# Patient Record
Sex: FEMALE | Race: BLACK OR AFRICAN AMERICAN | NOT HISPANIC OR LATINO | URBAN - METROPOLITAN AREA
[De-identification: names, ages, dates, MRNs, and addresses within clinical notes are randomized per-mention and may not be internally consistent; named-entity substitution may affect disease eponyms.]

---
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
## 2018-08-18 ENCOUNTER — EMERGENCY (EMERGENCY)
Facility: HOSPITAL | Age: 18
LOS: 0 days | Discharge: HOME | End: 2018-08-18
Attending: EMERGENCY MEDICINE | Admitting: EMERGENCY MEDICINE

## 2018-08-18 VITALS
WEIGHT: 110.23 LBS | DIASTOLIC BLOOD PRESSURE: 60 MMHG | OXYGEN SATURATION: 100 % | TEMPERATURE: 98 F | HEART RATE: 83 BPM | RESPIRATION RATE: 18 BRPM | SYSTOLIC BLOOD PRESSURE: 119 MMHG

## 2018-08-18 DIAGNOSIS — R10.30 LOWER ABDOMINAL PAIN, UNSPECIFIED: ICD-10-CM

## 2018-08-18 DIAGNOSIS — B37.9 CANDIDIASIS, UNSPECIFIED: ICD-10-CM

## 2018-08-18 LAB
APPEARANCE UR: ABNORMAL
BILIRUB UR-MCNC: NEGATIVE — SIGNIFICANT CHANGE UP
COLOR SPEC: YELLOW — SIGNIFICANT CHANGE UP
DIFF PNL FLD: NEGATIVE — SIGNIFICANT CHANGE UP
GLUCOSE UR QL: NEGATIVE MG/DL — SIGNIFICANT CHANGE UP
HCG UR QL: NEGATIVE — SIGNIFICANT CHANGE UP
KETONES UR-MCNC: NEGATIVE — SIGNIFICANT CHANGE UP
LEUKOCYTE ESTERASE UR-ACNC: ABNORMAL
NITRITE UR-MCNC: NEGATIVE — SIGNIFICANT CHANGE UP
PH UR: 6 — SIGNIFICANT CHANGE UP (ref 5–8)
PROT UR-MCNC: NEGATIVE MG/DL — SIGNIFICANT CHANGE UP
SP GR SPEC: 1.02 — SIGNIFICANT CHANGE UP (ref 1.01–1.03)
UROBILINOGEN FLD QL: 1 MG/DL (ref 0.2–0.2)

## 2018-08-18 RX ORDER — FLUCONAZOLE 150 MG/1
150 TABLET ORAL ONCE
Qty: 0 | Refills: 0 | Status: COMPLETED | OUTPATIENT
Start: 2018-08-18 | End: 2018-08-18

## 2018-08-18 RX ADMIN — FLUCONAZOLE 150 MILLIGRAM(S): 150 TABLET ORAL at 11:10

## 2018-08-18 NOTE — ED PROVIDER NOTE - OBJECTIVE STATEMENT
Patient is a healthy 17 year old female with no pmhx who presents to the ED with a 2 week history of cloudy and foul smelling urine, followed by x3 days of abdominal tenderness and 2x days of creamy frothy discharge.  Patient states that she endorses this is a yeast infection as she has had them in the past.  Patient denies any urinary urgency, increased urinary frequency, or burning sensation.  She endorses pruritus.  PMhx: none  PSx: none  medications: none  Allergies: none  UTD vaccines  Family history: not pertinent  HEADSS:  sexually active with one male partner, does not use condoms, has an irregular period, last tested for STIs in May 2018, all of which were negative.  Denies smoking, drinking or drugs.

## 2018-08-18 NOTE — ED PROVIDER NOTE - ATTENDING CONTRIBUTION TO CARE
16 yo F with no PMH, here with 2 weeks of cloudy and foul-smelling urine, and 2 days of suprapubic pain, and 1 day of white creamy discharge (h/o yeast infection in past and she feels this is similar). No urgency, no frequency. (+) sexually active with 1 male partner, no condom use. LMP 1 month ago, not regular and on OCP. Was tested for STIs in May and all negative, no change in partners since. No recent antibiotic use. Also has a mild cough x 2 weeks, afebrile. Exam - Gen - NAD, Head - NCAT, Pharynx - clear, MMM, Heart - RRR, no m/g/r, Lungs - CTAB, no w/c/r, Abdomen - soft, mild suprapubic tenderness, ND,  - External labia - visible white discharge, no redness, pelvic - No CMT, adnexal tenderness. 16 yo F with no PMH, here with 2 weeks of cloudy and foul-smelling urine, and 2 days of suprapubic pain, and 1 day of white creamy discharge (h/o yeast infection in past and she feels this is similar). (+) vulvar pruritis. No urgency, no frequency. (+) sexually active with 1 male partner, no condom use. LMP 1 month ago, not regular and on OCP. Was tested for STIs in May and all negative, no change in partners since. No recent antibiotic use. Also has a mild cough x 2 weeks, afebrile. Exam - Gen - NAD, Head - NCAT,  Heart - RRR, no m/g/r, Lungs - CTAB, no w/c/r, Abdomen - soft, mild suprapubic tenderness, ND,  - External labia - visible white discharge, no redness, pelvic - No CMT, adnexal tenderness. Patient refused empiric STI treatment, will wait for urine results. Plan - udip, upreg, UCx, urine GC/chlamydia, fluconazole. D/C home with ID clinic f/u.

## 2018-08-18 NOTE — ED PROVIDER NOTE - MEDICAL DECISION MAKING DETAILS
16 yo F with h/o yeast infection, here with similar symptoms, and mild suprapubic tenderness. Afebrile. No CVA tenderness, no urinary symptoms. Dx - yeast infection, d/ashlee home with fluconazole, advised ID clinic f/u for STI test results.

## 2018-08-18 NOTE — ED PROVIDER NOTE - CARE PLAN
Principal Discharge DX:	Candida albicans infection  Goal:	Treatment of infection  Assessment and plan of treatment:	Please follow up with infectious disease specialist for the result of your GC and chlamydia testing.  Please follow up with PMD in 2-3 days if the symptoms continue.  Please seek medical attention for any new or worsening medical conditions.

## 2018-08-18 NOTE — ED PROVIDER NOTE - PLAN OF CARE
Treatment of infection Please follow up with infectious disease specialist for the result of your GC and chlamydia testing.  Please follow up with PMD in 2-3 days if the symptoms continue.  Please seek medical attention for any new or worsening medical conditions.

## 2018-08-18 NOTE — ED PROVIDER NOTE - PROGRESS NOTE DETAILS
Patient underwent internal pelvic examination, no cervical, adnexal or uterine tenderness on palpation.  Creamy discharge seen on cervical exam.  Urine dip and urine pregnancy negative.  Patient received x1 dose of oral fluconazole and set for d/c. Patient underwent internal pelvic examination, no cervical, adnexal or uterine tenderness on palpation.  Creamy discharge seen on cervical exam.  Urine dip only positive for large leukocytes and urine pregnancy negative.  Given large vaginal discharge, leukocytes considered contaminant however urine culture sent to confirm.  Patient received x1 dose of oral fluconazole and set for d/c.

## 2018-08-20 LAB
-  AMIKACIN: SIGNIFICANT CHANGE UP
-  AMOXICILLIN/CLAVULANIC ACID: SIGNIFICANT CHANGE UP
-  AMPICILLIN/SULBACTAM: SIGNIFICANT CHANGE UP
-  AMPICILLIN: SIGNIFICANT CHANGE UP
-  AZTREONAM: SIGNIFICANT CHANGE UP
-  CEFAZOLIN: SIGNIFICANT CHANGE UP
-  CEFEPIME: SIGNIFICANT CHANGE UP
-  CEFOXITIN: SIGNIFICANT CHANGE UP
-  CEFTRIAXONE: SIGNIFICANT CHANGE UP
-  CIPROFLOXACIN: SIGNIFICANT CHANGE UP
-  ERTAPENEM: SIGNIFICANT CHANGE UP
-  GENTAMICIN: SIGNIFICANT CHANGE UP
-  IMIPENEM: SIGNIFICANT CHANGE UP
-  LEVOFLOXACIN: SIGNIFICANT CHANGE UP
-  MEROPENEM: SIGNIFICANT CHANGE UP
-  NITROFURANTOIN: SIGNIFICANT CHANGE UP
-  PIPERACILLIN/TAZOBACTAM: SIGNIFICANT CHANGE UP
-  TIGECYCLINE: SIGNIFICANT CHANGE UP
-  TOBRAMYCIN: SIGNIFICANT CHANGE UP
-  TRIMETHOPRIM/SULFAMETHOXAZOLE: SIGNIFICANT CHANGE UP
C TRACH RRNA SPEC QL NAA+PROBE: SIGNIFICANT CHANGE UP
CULTURE RESULTS: SIGNIFICANT CHANGE UP
METHOD TYPE: SIGNIFICANT CHANGE UP
N GONORRHOEA RRNA SPEC QL NAA+PROBE: SIGNIFICANT CHANGE UP
ORGANISM # SPEC MICROSCOPIC CNT: SIGNIFICANT CHANGE UP
ORGANISM # SPEC MICROSCOPIC CNT: SIGNIFICANT CHANGE UP
SPECIMEN SOURCE: SIGNIFICANT CHANGE UP
SPECIMEN SOURCE: SIGNIFICANT CHANGE UP

## 2018-08-21 RX ORDER — CEFUROXIME AXETIL 250 MG
1 TABLET ORAL
Qty: 14 | Refills: 0 | OUTPATIENT
Start: 2018-08-21 | End: 2018-08-27

## 2018-08-21 NOTE — ED POST DISCHARGE NOTE - DETAILS
CEFTIN CALLED INTO PHARMACY- LEFT MESSAGE ON PHARMACIST VOICEMAIL-  [address] [facility]- [id number]. SPOKE WITH PATIENT AND SHE WILL F/U WITH PMD

## 2018-12-28 ENCOUNTER — EMERGENCY (EMERGENCY)
Facility: HOSPITAL | Age: 18
LOS: 0 days | Discharge: HOME | End: 2018-12-28
Attending: EMERGENCY MEDICINE | Admitting: EMERGENCY MEDICINE

## 2018-12-28 VITALS
RESPIRATION RATE: 18 BRPM | HEART RATE: 86 BPM | OXYGEN SATURATION: 100 % | SYSTOLIC BLOOD PRESSURE: 115 MMHG | TEMPERATURE: 99 F | DIASTOLIC BLOOD PRESSURE: 80 MMHG | WEIGHT: 117.29 LBS

## 2018-12-28 DIAGNOSIS — Z79.899 OTHER LONG TERM (CURRENT) DRUG THERAPY: ICD-10-CM

## 2018-12-28 DIAGNOSIS — M54.9 DORSALGIA, UNSPECIFIED: ICD-10-CM

## 2018-12-28 DIAGNOSIS — M54.5 LOW BACK PAIN: ICD-10-CM

## 2018-12-28 RX ORDER — METHOCARBAMOL 500 MG/1
1 TABLET, FILM COATED ORAL
Qty: 7 | Refills: 0
Start: 2018-12-28 | End: 2019-01-03

## 2018-12-28 NOTE — ED PROVIDER NOTE - PHYSICAL EXAMINATION
General: Well developed; well nourished; in no acute distress    Head: Normocephalic; atraumatic  ENMT: External ear normal, tympanic membranes intact, nasal mucosa normal, no nasal discharge; airway clear, oropharynx clear  Respiratory: No chest wall deformity, normal respiratory pattern, clear to auscultation bilaterally  Cardiovascular: Regular rate and rhythm. S1 and S2 Normal; No murmurs, gallops or rubs  Genitourinary: No costovertebral angle tenderness.  Extremities: Full range of motion, no tenderness, no cyanosis or edema  Vascular: Upper and lower peripheral pulses palpable 2+ bilaterally  Neurological: Alert, affect appropriate, no acute change from baseline.   Skin: Warm and dry. No acute rash, no subcutaneous nodules  Musculoskeletal: Normal gait, tone, without deformities, no tenderness to palpation along the back or spine.   Psychiatric: Cooperative and appropriate

## 2018-12-28 NOTE — ED PROVIDER NOTE - ATTENDING CONTRIBUTION TO CARE
18 year old female, no pmhx, presenting with intermittent back pain x 1 week. States it is sharp, paraspinal, feels like an electric shock. States she gets an initial shock and then it lasts as a dull pain anywhere from 30 minutes to an hour, radiating down her back. No known palliative or provocative factors, 7/10 at its worst. Otherwise denies fevers, IVDA, incontinence, headache, vision changes, weakness/numbness, confusion, URI symptoms, neck pain, chest pain, back pain, dyspnea, cough, palpitations, nausea, vomiting, abdominal pain, diarrhea, constipation, blood in stool/dark stools, urinary symptoms, vaginal bleeding/discharge, leg swelling, rash, recent travel or sick contacts.    Vital Signs: I have reviewed the initial vital signs.  Constitutional: NAD, well-nourished, appears stated age, no acute distress.  HEENT: Airway patent, moist MM, no erythema/swelling/deformity of oral structures. EOMI, PERRLA.  CV: regular rate, regular rhythm, well-perfused extremities, 2+ b/l DP and radial pulses equal.  Lungs: BCTA, no increased WOB.  ABD: NTND, no guarding or rebound, no pulsatile mass, no hernias.   MSK: Neck supple, nontender, nl ROM, no stepoff. Chest nontender. Back nontender in TLS spine or to b/l bony structures or flanks. Ext nontender, nl rom, no deformity. (+) Tenderness in paraspinal lumbar area bilaterally with (+) muscle spasm  INTEG: Skin warm, dry, no rash.  NEURO: A&Ox3, CN II-XII intact, normal strength 5/5 all 4 ext, nl sensation throughout, normal speech and coordination.  PSYCH: Calm, cooperative, normal affect and interaction.

## 2018-12-28 NOTE — ED PROVIDER NOTE - MEDICAL DECISION MAKING DETAILS
Patient presented with lower back pain that on exam appears to be muscle spasm. Neuro intact, no concerning signs for cauda equina, no trauma, no fevers, no IVDA, well appearing. Symptoms improve at home per patient with naprosyn. Will prescribe robaxin along with the naprosyn and patient agrees to follow up with PMD. Will also give neuro follow up as well given shock-like sensation. Patient ambulatory and tolerates PO, afebrile and HD stable.

## 2018-12-28 NOTE — ED PROVIDER NOTE - OBJECTIVE STATEMENT
18 year old female with no PMH presents with intermittent back pain that started one week ago. Pt. states that the pain is sharp, feels like electric shock going thru her. It last for an hour and then gradually goes away. Pt. tried naproxen for the pain but is unsure if it helped. She denies any urinary symptoms or incontinence.

## 2019-01-12 ENCOUNTER — OUTPATIENT (OUTPATIENT)
Dept: OUTPATIENT SERVICES | Facility: HOSPITAL | Age: 19
LOS: 1 days | Discharge: HOME | End: 2019-01-12

## 2019-01-12 DIAGNOSIS — M54.5 LOW BACK PAIN: ICD-10-CM

## 2019-01-14 ENCOUNTER — RESULT CHARGE (OUTPATIENT)
Age: 19
End: 2019-01-14

## 2019-01-14 ENCOUNTER — OUTPATIENT (OUTPATIENT)
Dept: OUTPATIENT SERVICES | Facility: HOSPITAL | Age: 19
LOS: 1 days | Discharge: HOME | End: 2019-01-14

## 2019-01-14 ENCOUNTER — APPOINTMENT (OUTPATIENT)
Dept: PEDIATRIC ADOLESCENT MEDICINE | Facility: CLINIC | Age: 19
End: 2019-01-14

## 2019-01-14 VITALS
BODY MASS INDEX: 20.65 KG/M2 | SYSTOLIC BLOOD PRESSURE: 118 MMHG | DIASTOLIC BLOOD PRESSURE: 62 MMHG | HEART RATE: 94 BPM | HEIGHT: 63.5 IN | WEIGHT: 118 LBS | RESPIRATION RATE: 20 BRPM

## 2019-01-14 DIAGNOSIS — Z30.41 ENCOUNTER FOR SURVEILLANCE OF CONTRACEPTIVE PILLS: ICD-10-CM

## 2019-01-14 DIAGNOSIS — Z70.9 SEX COUNSELING, UNSPECIFIED: ICD-10-CM

## 2019-01-14 DIAGNOSIS — Z32.02 ENCOUNTER FOR PREGNANCY TEST, RESULT NEGATIVE: ICD-10-CM

## 2019-01-14 DIAGNOSIS — Z11.3 ENCOUNTER FOR SCREENING FOR INFECTIONS WITH A PREDOMINANTLY SEXUAL MODE OF TRANSMISSION: ICD-10-CM

## 2019-01-14 LAB — HCG UR QL: NEGATIVE

## 2019-01-14 NOTE — END OF VISIT
[] : Resident [>50% of Time Spent on Counseling for ____] : Greater than 50% of the encounter time was spent on counseling for [unfilled] [Time Spent: ___ minutes] : I have spent [unfilled] minutes of face to face time with the patient [FreeTextEntry3] : pt signed method specific consent form

## 2019-01-14 NOTE — HISTORY OF PRESENT ILLNESS
[de-identified] : birth control refill [FreeTextEntry6] : Has been on Alysena 28 since March 2018, no side effects, would like to continue \par Has regular periods, uses pads, period lasts 5-6 days, no concerns about pain or excessive bleeding \par Uses condoms consistently, sexually active with one male partner, feels safe \par Wants gonorrhea/chlamydia urine test, does not want HIV/RPR testing\par Denies vaginal discharge, dysuria \par \par Takes Ibuprofen PRN for back pain, no other meds, denies allergies; does not smoke, denies history of migraines, strokes, heart disease

## 2019-01-14 NOTE — DISCUSSION/SUMMARY
[FreeTextEntry1] : 19 yo otherwise healthy female here for birth control refill, gets prescription in [place] and would like to be prescribed the equivalent. No other concerns/complaints, pregnancy test negative, will send G/C testing

## 2019-01-15 ENCOUNTER — RESULT REVIEW (OUTPATIENT)
Age: 19
End: 2019-01-15

## 2019-01-15 DIAGNOSIS — Z70.9 SEX COUNSELING, UNSPECIFIED: ICD-10-CM

## 2019-01-15 DIAGNOSIS — Z11.3 ENCOUNTER FOR SCREENING FOR INFECTIONS WITH A PREDOMINANTLY SEXUAL MODE OF TRANSMISSION: ICD-10-CM

## 2019-01-15 DIAGNOSIS — Z32.02 ENCOUNTER FOR PREGNANCY TEST, RESULT NEGATIVE: ICD-10-CM

## 2019-01-15 DIAGNOSIS — Z30.41 ENCOUNTER FOR SURVEILLANCE OF CONTRACEPTIVE PILLS: ICD-10-CM

## 2019-01-15 LAB
C TRACH RRNA SPEC QL NAA+PROBE: NOT DETECTED
N GONORRHOEA RRNA SPEC QL NAA+PROBE: NOT DETECTED
SOURCE AMPLIFICATION: NORMAL

## 2019-03-14 ENCOUNTER — TRANSCRIPTION ENCOUNTER (OUTPATIENT)
Age: 19
End: 2019-03-14

## 2019-04-30 ENCOUNTER — OUTPATIENT (OUTPATIENT)
Dept: OUTPATIENT SERVICES | Facility: HOSPITAL | Age: 19
LOS: 1 days | Discharge: HOME | End: 2019-04-30

## 2019-05-07 ENCOUNTER — TRANSCRIPTION ENCOUNTER (OUTPATIENT)
Age: 19
End: 2019-05-07

## 2019-05-14 DIAGNOSIS — D64.9 ANEMIA, UNSPECIFIED: ICD-10-CM

## 2019-08-13 ENCOUNTER — APPOINTMENT (OUTPATIENT)
Dept: PEDIATRIC ADOLESCENT MEDICINE | Facility: CLINIC | Age: 19
End: 2019-08-13
Payer: MEDICAID

## 2019-08-13 ENCOUNTER — OUTPATIENT (OUTPATIENT)
Dept: OUTPATIENT SERVICES | Facility: HOSPITAL | Age: 19
LOS: 1 days | Discharge: HOME | End: 2019-08-13

## 2019-08-13 VITALS
WEIGHT: 116.56 LBS | HEART RATE: 88 BPM | HEIGHT: 63 IN | BODY MASS INDEX: 20.65 KG/M2 | SYSTOLIC BLOOD PRESSURE: 110 MMHG | DIASTOLIC BLOOD PRESSURE: 62 MMHG | RESPIRATION RATE: 22 BRPM

## 2019-08-13 DIAGNOSIS — Z71.89 OTHER SPECIFIED COUNSELING: ICD-10-CM

## 2019-08-13 DIAGNOSIS — Z78.9 OTHER SPECIFIED HEALTH STATUS: ICD-10-CM

## 2019-08-13 DIAGNOSIS — N63.10 UNSPECIFIED LUMP IN THE RIGHT BREAST, UNSPECIFIED QUADRANT: ICD-10-CM

## 2019-08-13 PROCEDURE — 99213 OFFICE O/P EST LOW 20 MIN: CPT

## 2019-08-13 NOTE — HISTORY OF PRESENT ILLNESS
[___ Day(s)] : [unfilled] day(s) [de-identified] : breast mass on right [Pain Scale: ____] : Pain scale: [unfilled] [FreeTextEntry6] : pt requesting evaluation of right breast mass. she felt it yesterday when she had pain in that area. LMP 8/2/2019.\par otherwise feels well. no complaints

## 2019-08-13 NOTE — RISK ASSESSMENT
[Has family members/adults to turn to for help] : has family members/adults to turn to for help [Has friends] : has friends [Home is free of violence] : home is free of violence [Displays self-confidence] : displays self-confidence [Has peer relationships free of violence] : has peer relationships free of violence

## 2019-08-13 NOTE — PHYSICAL EXAM
[Milton: ____] : Milton [unfilled] [No Nipple Discharge] : no nipple discharge [Normal Appearance] : normal appearance [NL] : warm [de-identified] : chaperoned by SONAL Licona MA, discrete nontender mobile firm 1 cm mass in upper outer quadrant of her right breast, left breast no masses found

## 2019-08-13 NOTE — DISCUSSION/SUMMARY
[FreeTextEntry1] : advised to recheck at the time of the next period\par agreed to referral for ultrasound

## 2019-08-23 ENCOUNTER — TRANSCRIPTION ENCOUNTER (OUTPATIENT)
Age: 19
End: 2019-08-23

## 2019-08-27 ENCOUNTER — FORM ENCOUNTER (OUTPATIENT)
Age: 19
End: 2019-08-27

## 2019-08-28 ENCOUNTER — OUTPATIENT (OUTPATIENT)
Dept: OUTPATIENT SERVICES | Facility: HOSPITAL | Age: 19
LOS: 1 days | Discharge: HOME | End: 2019-08-28
Payer: MEDICAID

## 2019-08-28 DIAGNOSIS — R92.8 OTHER ABNORMAL AND INCONCLUSIVE FINDINGS ON DIAGNOSTIC IMAGING OF BREAST: ICD-10-CM

## 2019-08-28 DIAGNOSIS — N63.10 UNSPECIFIED LUMP IN THE RIGHT BREAST, UNSPECIFIED QUADRANT: ICD-10-CM

## 2019-08-28 PROCEDURE — 76642 ULTRASOUND BREAST LIMITED: CPT | Mod: 26,RT

## 2019-11-01 ENCOUNTER — TRANSCRIPTION ENCOUNTER (OUTPATIENT)
Age: 19
End: 2019-11-01

## 2019-12-13 ENCOUNTER — TRANSCRIPTION ENCOUNTER (OUTPATIENT)
Age: 19
End: 2019-12-13

## 2020-01-16 ENCOUNTER — EMERGENCY (EMERGENCY)
Facility: HOSPITAL | Age: 20
LOS: 0 days | Discharge: HOME | End: 2020-01-16
Attending: EMERGENCY MEDICINE | Admitting: EMERGENCY MEDICINE
Payer: MEDICAID

## 2020-01-16 VITALS
RESPIRATION RATE: 18 BRPM | TEMPERATURE: 98 F | SYSTOLIC BLOOD PRESSURE: 105 MMHG | OXYGEN SATURATION: 100 % | HEART RATE: 90 BPM | DIASTOLIC BLOOD PRESSURE: 66 MMHG

## 2020-01-16 DIAGNOSIS — S06.9X9A UNSPECIFIED INTRACRANIAL INJURY WITH LOSS OF CONSCIOUSNESS OF UNSPECIFIED DURATION, INITIAL ENCOUNTER: ICD-10-CM
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
## 2020-01-16 DIAGNOSIS — R55 SYNCOPE AND COLLAPSE: ICD-10-CM
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
## 2020-01-16 PROCEDURE — 99284 EMERGENCY DEPT VISIT MOD MDM: CPT

## 2020-01-16 PROCEDURE — 93010 ELECTROCARDIOGRAM REPORT: CPT

## 2020-01-16 RX ORDER — ACETAMINOPHEN 500 MG
975 TABLET ORAL ONCE
Refills: 0 | Status: COMPLETED | OUTPATIENT
Start: 2020-01-16 | End: 2020-01-16

## 2020-01-16 RX ORDER — SODIUM CHLORIDE 9 MG/ML
1000 INJECTION INTRAMUSCULAR; INTRAVENOUS; SUBCUTANEOUS ONCE
Refills: 0 | Status: COMPLETED | OUTPATIENT
Start: 2020-01-16 | End: 2020-01-16

## 2020-01-16 RX ADMIN — SODIUM CHLORIDE 1000 MILLILITER(S): 9 INJECTION INTRAMUSCULAR; INTRAVENOUS; SUBCUTANEOUS at 13:50

## 2020-01-16 RX ADMIN — SODIUM CHLORIDE 1000 MILLILITER(S): 9 INJECTION INTRAMUSCULAR; INTRAVENOUS; SUBCUTANEOUS at 14:29

## 2020-01-16 RX ADMIN — Medication 975 MILLIGRAM(S): at 13:48

## 2020-01-16 NOTE — ED ADULT TRIAGE NOTE - CHIEF COMPLAINT QUOTE
pt stated she fainted in urgent care this morning at 0845, c/o headache pressure before she fainted. pt stated she fainted in urgent care this morning at 0845, c/o headache pressure before she fainted. ( f/s 80 in triage)

## 2020-01-16 NOTE — ED PROVIDER NOTE - NS ED ROS FT
Eyes:  No visual changes, eye pain or discharge.  ENMT:  No hearing changes, pain, no sore throat or runny nose, no difficulty swallowing  Cardiac:  No chest pain, SOB or edema. No chest pain with exertion.  Respiratory:  No cough or respiratory distress. No hemoptysis. No history of asthma or RAD.  GI:  No nausea, vomiting, diarrhea or abdominal pain.  :  No dysuria, frequency or burning.  MS:  No myalgia, muscle weakness, joint pain or back pain.  Neuro:  No headache or weakness.  +LOC  Skin:  No skin rash.   Endocrine: No history of thyroid disease or diabetes.

## 2020-01-16 NOTE — ED PROVIDER NOTE - OBJECTIVE STATEMENT
19y F no pmh presenting after syncope this morning. Began to feel hot this morning, with frontal pressure. +LOC. +head trauma. Also hurt her sacrum during the fall. 19y F no pmh presenting after syncope this morning. Began to feel hot this morning, with frontal pressure. +LOC. +head trauma. Also hurt her sacrum during the fall. Currently at baseline in the ED. No CP/SOB. No abdominal pain. Does endorse that appetite has been low the past few days. Requesting to go home.

## 2020-01-16 NOTE — ED PROVIDER NOTE - ATTENDING CONTRIBUTION TO CARE
18 yo F no pmh presents after a syncopal episode. States that she was at work when she started to feel light headed. Fell backwards, hit the back of head. Co-worker noted sharking for few seconds and then she woke up and was her normal self right away, no incontinence. Went to urgent care who sent patient here for further evaluation. States that she didn't have anything to eat since yesterday afternoon. Had headache before that improved with tylenol. no n/v, no  numbness, tingling or weakness. no cp, no sob, no palpitations.     CONSTITUTIONAL: Well-developed; well-nourished; in no acute distress.   SKIN: warm, dry  HEAD: Normocephalic; atraumatic.  EYES: PERRL, EOMI, no conjunctival erythema  ENT: No nasal discharge; airway clear.  NECK: Supple; non tender.  CARD: S1, S2 normal;  Regular rate and rhythm.   RESP: No wheezes, rales or rhonchi.  ABD: soft non tender, non distended, no rebound or guarding  EXT: Normal ROM.    LYMPH: No acute cervical adenopathy.  NEURO: Alert, oriented, grossly unremarkable. CN 2-11 intact, 5/5 strength in all 4 extremities, equal sensation bilaterally   PSYCH: Cooperative, appropriate.

## 2020-01-16 NOTE — ED ADULT NURSE NOTE - CHIEF COMPLAINT QUOTE
pt stated she fainted in urgent care this morning at 0845, c/o headache pressure before she fainted. ( f/s 80 in triage)

## 2020-01-16 NOTE — ED PROVIDER NOTE - PHYSICAL EXAMINATION
"Initially seen by OT on this date. Ayana  attended 2 of 3 OT groups today. She  attended an OT Health and Coping group that involved stretching and gentle movements for relaxation & stress relief. Also attended a reflection group involving gratitude & an carmita art project. Identified \"Sergio\" as someone she is grateful for. Affect restricted; mostly quiet & pleasant. More observation needed to complete initial evaluation at this time.      10/24/19 1500   Occupational Therapy   Type of Intervention structured groups   Response Initiates, socially acceptable   Hours 2     " CONSTITUTIONAL: Well-developed; well-nourished; in no acute distress.   SKIN: warm, dry  HEAD: Normocephalic; atraumatic.  EYES: no conjunctival injection. PERRL.   ENT: No nasal discharge; airway clear.  NECK: Supple; non tender.  CARD: S1, S2 normal; no murmurs, gallops, or rubs. Regular rate and rhythm.   RESP: No wheezes, rales or rhonchi.  ABD: soft ntnd  EXT: Normal ROM.  No clubbing, cyanosis or edema.   LYMPH: No acute cervical adenopathy.  NEURO: Alert, oriented, grossly unremarkable  PSYCH: Cooperative, appropriate.

## 2020-01-16 NOTE — ED PROVIDER NOTE - NSFOLLOWUPCLINICS_GEN_ALL_ED_FT
A Family Medicine Doctor  Family Medicine  .  [place]   Phone:   Fax:   Follow Up Time:     [facility]  Neurology  [address] [place] [address], [facility] [address]  [place] [address]  Phone: [phone]  Fax:   Follow Up Time:     [facility] [place]  Cardiology  [address] [place] [address]  Phone: [phone]  Fax:   Follow Up Time:

## 2020-01-16 NOTE — ED ADULT NURSE NOTE - NSIMPLEMENTINTERV_GEN_ALL_ED
Implemented All Universal Safety Interventions:  [place] to call system. Call bell, personal items and telephone within reach. Instruct patient to call for assistance. Room bathroom lighting operational. Non-slip footwear when patient is off stretcher. Physically safe environment: no spills, clutter or unnecessary equipment. Stretcher in lowest position, wheels locked, appropriate side rails in place.

## 2020-01-16 NOTE — ED PROVIDER NOTE - CLINICAL SUMMARY MEDICAL DECISION MAKING FREE TEXT BOX
Patient presents after syncopal episode. upreg negative. EKG normal. normal exam. patient understands to follow up with pmd. Return precautions discussed.

## 2020-01-16 NOTE — ED ADULT NURSE NOTE - OBJECTIVE STATEMENT
pt presents to ED sent in by Urgent care for syncope at work this morning. +LOC + head trauma, pt states she experienced a frontal headache/pressure. Pt awake and alert at this time

## 2020-01-16 NOTE — ED PROVIDER NOTE - PATIENT PORTAL LINK FT
You can access the FollowMyHealth Patient Portal offered by [facility] by registering at the following website: [email or web address]. By joining [facility]’s FollowMyHealth portal, you will also be able to view your health information using other applications (apps) compatible with our system.

## 2020-06-24 ENCOUNTER — APPOINTMENT (OUTPATIENT)
Dept: PEDIATRIC ADOLESCENT MEDICINE | Facility: CLINIC | Age: 20
End: 2020-06-24
Payer: COMMERCIAL

## 2020-06-24 ENCOUNTER — OUTPATIENT (OUTPATIENT)
Dept: OUTPATIENT SERVICES | Facility: HOSPITAL | Age: 20
LOS: 1 days | Discharge: HOME | End: 2020-06-24

## 2020-06-24 PROCEDURE — 99442: CPT

## 2020-06-24 RX ORDER — LEVONORGESTREL AND ETHINYL ESTRADIOL 0.1-0.02MG
0.1-2 KIT ORAL DAILY
Qty: 1 | Refills: 5 | Status: ACTIVE | COMMUNITY
Start: 2019-01-14 | End: 1900-01-01

## 2020-06-27 DIAGNOSIS — Z30.41 ENCOUNTER FOR SURVEILLANCE OF CONTRACEPTIVE PILLS: ICD-10-CM

## 2020-06-27 DIAGNOSIS — Z71.9 COUNSELING, UNSPECIFIED: ICD-10-CM

## 2020-06-27 DIAGNOSIS — Z30.09 ENCOUNTER FOR OTHER GENERAL COUNSELING AND ADVICE ON CONTRACEPTION: ICD-10-CM

## 2020-06-27 DIAGNOSIS — Z70.9 SEX COUNSELING, UNSPECIFIED: ICD-10-CM

## 2020-08-17 ENCOUNTER — TRANSCRIPTION ENCOUNTER (OUTPATIENT)
Age: 20
End: 2020-08-17

## 2020-10-07 ENCOUNTER — OUTPATIENT (OUTPATIENT)
Dept: OUTPATIENT SERVICES | Facility: HOSPITAL | Age: 20
LOS: 1 days | Discharge: HOME | End: 2020-10-07

## 2020-10-26 ENCOUNTER — TRANSCRIPTION ENCOUNTER (OUTPATIENT)
Age: 20
End: 2020-10-26

## 2020-12-30 ENCOUNTER — OUTPATIENT (OUTPATIENT)
Dept: OUTPATIENT SERVICES | Facility: HOSPITAL | Age: 20
LOS: 1 days | Discharge: HOME | End: 2020-12-30
Payer: MEDICAID

## 2020-12-30 DIAGNOSIS — R51.9 HEADACHE, UNSPECIFIED: ICD-10-CM

## 2020-12-30 PROCEDURE — 76536 US EXAM OF HEAD AND NECK: CPT | Mod: 26

## 2021-01-06 ENCOUNTER — OUTPATIENT (OUTPATIENT)
Dept: OUTPATIENT SERVICES | Facility: HOSPITAL | Age: 21
LOS: 1 days | Discharge: HOME | End: 2021-01-06
Payer: MEDICAID

## 2021-01-06 DIAGNOSIS — E28.2 POLYCYSTIC OVARIAN SYNDROME: ICD-10-CM

## 2021-01-06 PROCEDURE — 76830 TRANSVAGINAL US NON-OB: CPT | Mod: 26

## 2021-01-06 PROCEDURE — 76856 US EXAM PELVIC COMPLETE: CPT | Mod: 26

## 2021-01-25 ENCOUNTER — OUTPATIENT (OUTPATIENT)
Dept: OUTPATIENT SERVICES | Facility: HOSPITAL | Age: 21
LOS: 1 days | Discharge: HOME | End: 2021-01-25

## 2021-02-19 ENCOUNTER — TRANSCRIPTION ENCOUNTER (OUTPATIENT)
Age: 21
End: 2021-02-19

## 2021-02-24 ENCOUNTER — EMERGENCY (EMERGENCY)
Facility: HOSPITAL | Age: 21
LOS: 0 days | Discharge: HOME | End: 2021-02-24
Attending: EMERGENCY MEDICINE | Admitting: EMERGENCY MEDICINE
Payer: MEDICAID

## 2021-02-24 VITALS
HEART RATE: 151 BPM | WEIGHT: 115.08 LBS | SYSTOLIC BLOOD PRESSURE: 129 MMHG | TEMPERATURE: 99 F | OXYGEN SATURATION: 99 % | HEIGHT: 62 IN | DIASTOLIC BLOOD PRESSURE: 79 MMHG | RESPIRATION RATE: 18 BRPM

## 2021-02-24 VITALS — HEART RATE: 72 BPM

## 2021-02-24 DIAGNOSIS — R59.0 LOCALIZED ENLARGED LYMPH NODES: ICD-10-CM

## 2021-02-24 DIAGNOSIS — R91.1 SOLITARY PULMONARY NODULE: ICD-10-CM

## 2021-02-24 DIAGNOSIS — R00.2 PALPITATIONS: ICD-10-CM

## 2021-02-24 DIAGNOSIS — R00.0 TACHYCARDIA, UNSPECIFIED: ICD-10-CM

## 2021-02-24 DIAGNOSIS — Z20.822 CONTACT WITH AND (SUSPECTED) EXPOSURE TO COVID-19: ICD-10-CM

## 2021-02-24 DIAGNOSIS — R07.89 OTHER CHEST PAIN: ICD-10-CM

## 2021-02-24 LAB
ALBUMIN SERPL ELPH-MCNC: 3.8 G/DL — SIGNIFICANT CHANGE UP (ref 3.5–5.2)
ALP SERPL-CCNC: 90 U/L — SIGNIFICANT CHANGE UP (ref 30–115)
ALT FLD-CCNC: 11 U/L — SIGNIFICANT CHANGE UP (ref 0–41)
ANION GAP SERPL CALC-SCNC: 11 MMOL/L — SIGNIFICANT CHANGE UP (ref 7–14)
APPEARANCE UR: CLEAR — SIGNIFICANT CHANGE UP
AST SERPL-CCNC: 13 U/L — SIGNIFICANT CHANGE UP (ref 0–41)
BASOPHILS # BLD AUTO: 0.02 K/UL — SIGNIFICANT CHANGE UP (ref 0–0.2)
BASOPHILS NFR BLD AUTO: 0.1 % — SIGNIFICANT CHANGE UP (ref 0–1)
BILIRUB SERPL-MCNC: 0.3 MG/DL — SIGNIFICANT CHANGE UP (ref 0.2–1.2)
BILIRUB UR-MCNC: NEGATIVE — SIGNIFICANT CHANGE UP
BUN SERPL-MCNC: 11 MG/DL — SIGNIFICANT CHANGE UP (ref 10–20)
CALCIUM SERPL-MCNC: 9.1 MG/DL — SIGNIFICANT CHANGE UP (ref 8.5–10.1)
CHLORIDE SERPL-SCNC: 102 MMOL/L — SIGNIFICANT CHANGE UP (ref 98–110)
CO2 SERPL-SCNC: 25 MMOL/L — SIGNIFICANT CHANGE UP (ref 17–32)
COLOR SPEC: SIGNIFICANT CHANGE UP
CREAT SERPL-MCNC: 0.6 MG/DL — LOW (ref 0.7–1.5)
D DIMER BLD IA.RAPID-MCNC: 325 NG/ML DDU — HIGH (ref 0–230)
DIFF PNL FLD: NEGATIVE — SIGNIFICANT CHANGE UP
EOSINOPHIL # BLD AUTO: 0.21 K/UL — SIGNIFICANT CHANGE UP (ref 0–0.7)
EOSINOPHIL NFR BLD AUTO: 1.2 % — SIGNIFICANT CHANGE UP (ref 0–8)
GLUCOSE SERPL-MCNC: 100 MG/DL — HIGH (ref 70–99)
GLUCOSE UR QL: NEGATIVE — SIGNIFICANT CHANGE UP
HCG SERPL QL: NEGATIVE — SIGNIFICANT CHANGE UP
HCT VFR BLD CALC: 32.3 % — LOW (ref 37–47)
HCT VFR BLD CALC: 35.3 % — LOW (ref 37–47)
HGB BLD-MCNC: 10 G/DL — LOW (ref 12–16)
HGB BLD-MCNC: 10.7 G/DL — LOW (ref 12–16)
IMM GRANULOCYTES NFR BLD AUTO: 0.4 % — HIGH (ref 0.1–0.3)
KETONES UR-MCNC: NEGATIVE — SIGNIFICANT CHANGE UP
LEUKOCYTE ESTERASE UR-ACNC: NEGATIVE — SIGNIFICANT CHANGE UP
LYMPHOCYTES # BLD AUTO: 16.2 % — LOW (ref 20.5–51.1)
LYMPHOCYTES # BLD AUTO: 2.74 K/UL — SIGNIFICANT CHANGE UP (ref 1.2–3.4)
MAGNESIUM SERPL-MCNC: 1.8 MG/DL — SIGNIFICANT CHANGE UP (ref 1.8–2.4)
MCHC RBC-ENTMCNC: 24.4 PG — LOW (ref 27–31)
MCHC RBC-ENTMCNC: 25 PG — LOW (ref 27–31)
MCHC RBC-ENTMCNC: 30.3 G/DL — LOW (ref 32–37)
MCHC RBC-ENTMCNC: 31 G/DL — LOW (ref 32–37)
MCV RBC AUTO: 80.6 FL — LOW (ref 81–99)
MCV RBC AUTO: 80.8 FL — LOW (ref 81–99)
MONOCYTES # BLD AUTO: 0.84 K/UL — HIGH (ref 0.1–0.6)
MONOCYTES NFR BLD AUTO: 5 % — SIGNIFICANT CHANGE UP (ref 1.7–9.3)
NEUTROPHILS # BLD AUTO: 13.01 K/UL — HIGH (ref 1.4–6.5)
NEUTROPHILS NFR BLD AUTO: 77.1 % — HIGH (ref 42.2–75.2)
NITRITE UR-MCNC: NEGATIVE — SIGNIFICANT CHANGE UP
NRBC # BLD: 0 /100 WBCS — SIGNIFICANT CHANGE UP (ref 0–0)
NRBC # BLD: 0 /100 WBCS — SIGNIFICANT CHANGE UP (ref 0–0)
NT-PROBNP SERPL-SCNC: 24 PG/ML — SIGNIFICANT CHANGE UP (ref 0–300)
PH UR: 6.5 — SIGNIFICANT CHANGE UP (ref 5–8)
PLATELET # BLD AUTO: 364 K/UL — SIGNIFICANT CHANGE UP (ref 130–400)
PLATELET # BLD AUTO: 413 K/UL — HIGH (ref 130–400)
POTASSIUM SERPL-MCNC: 4 MMOL/L — SIGNIFICANT CHANGE UP (ref 3.5–5)
POTASSIUM SERPL-SCNC: 4 MMOL/L — SIGNIFICANT CHANGE UP (ref 3.5–5)
PROT SERPL-MCNC: 7.9 G/DL — SIGNIFICANT CHANGE UP (ref 6–8)
PROT UR-MCNC: NEGATIVE — SIGNIFICANT CHANGE UP
RBC # BLD: 4 M/UL — LOW (ref 4.2–5.4)
RBC # BLD: 4.38 M/UL — SIGNIFICANT CHANGE UP (ref 4.2–5.4)
RBC # FLD: 14.6 % — HIGH (ref 11.5–14.5)
RBC # FLD: 14.6 % — HIGH (ref 11.5–14.5)
SARS-COV-2 RNA SPEC QL NAA+PROBE: SIGNIFICANT CHANGE UP
SODIUM SERPL-SCNC: 138 MMOL/L — SIGNIFICANT CHANGE UP (ref 135–146)
SP GR SPEC: 1.01 — SIGNIFICANT CHANGE UP (ref 1.01–1.03)
TROPONIN T SERPL-MCNC: <0.01 NG/ML — SIGNIFICANT CHANGE UP
UROBILINOGEN FLD QL: SIGNIFICANT CHANGE UP
WBC # BLD: 12.78 K/UL — HIGH (ref 4.8–10.8)
WBC # BLD: 16.89 K/UL — HIGH (ref 4.8–10.8)
WBC # FLD AUTO: 12.78 K/UL — HIGH (ref 4.8–10.8)
WBC # FLD AUTO: 16.89 K/UL — HIGH (ref 4.8–10.8)

## 2021-02-24 PROCEDURE — 93010 ELECTROCARDIOGRAM REPORT: CPT

## 2021-02-24 PROCEDURE — 71275 CT ANGIOGRAPHY CHEST: CPT | Mod: 26

## 2021-02-24 PROCEDURE — 99285 EMERGENCY DEPT VISIT HI MDM: CPT

## 2021-02-24 PROCEDURE — 71045 X-RAY EXAM CHEST 1 VIEW: CPT | Mod: 26

## 2021-02-24 RX ORDER — SODIUM CHLORIDE 9 MG/ML
1000 INJECTION, SOLUTION INTRAVENOUS ONCE
Refills: 0 | Status: COMPLETED | OUTPATIENT
Start: 2021-02-24 | End: 2021-02-24

## 2021-02-24 RX ORDER — SODIUM CHLORIDE 9 MG/ML
1000 INJECTION INTRAMUSCULAR; INTRAVENOUS; SUBCUTANEOUS ONCE
Refills: 0 | Status: COMPLETED | OUTPATIENT
Start: 2021-02-24 | End: 2021-02-24

## 2021-02-24 RX ADMIN — SODIUM CHLORIDE 1000 MILLILITER(S): 9 INJECTION, SOLUTION INTRAVENOUS at 08:55

## 2021-02-24 RX ADMIN — SODIUM CHLORIDE 2000 MILLILITER(S): 9 INJECTION INTRAMUSCULAR; INTRAVENOUS; SUBCUTANEOUS at 06:44

## 2021-02-24 NOTE — ED ADULT NURSE NOTE - OBJECTIVE STATEMENT
PT woke up at 0530 today with lower left side rib pain. Pt stated she was slightly sob at the time but denies it now. Pt HR at triage was 151. Pt placed on cardiac monitor and HR dropped down to 120. No left swelling noted. Pt did disclosed that she is on birth control. Pt denies any drug or alcohol use.

## 2021-02-24 NOTE — ED PROVIDER NOTE - CARE PROVIDER_API CALL
Christos Elizabeth  INTERNAL MEDICINE  [address] [place] [address]  Phone: [phone]  Fax: [phone]  Follow Up Time: 1-3 Days

## 2021-02-24 NOTE — ED PROVIDER NOTE - NS ED ROS FT
Constitutional: see hPI  Cardiac: see HPI  Respiratory: No cough or respiratory distress  GI: No nausea, vomiting, diarrhea or abdominal pain.  : No dysuria, frequency, urgency or hematuria  MS: no pain to back or extremities, no loss of ROM, no weakness  Neuro: No headache or weakness. No LOC.  Skin: No skin rash.  Except as documented in the HPI, all other systems are negative.

## 2021-02-24 NOTE — ED PROVIDER NOTE - CARE PLAN
Assessment and plan of treatment:	Plan: EKG, CXR, labs, pernancy test, reassess.   Principal Discharge DX:	Chest pain  Assessment and plan of treatment:	Plan: EKG, CXR, labs, pernancy test, reassess.  Secondary Diagnosis:	Palpitations  Secondary Diagnosis:	Lymphadenopathy  Secondary Diagnosis:	Lung nodule

## 2021-02-24 NOTE — ED PROVIDER NOTE - PHYSICAL EXAMINATION
VITAL SIGNS: I have reviewed nursing notes and confirm.  CONSTITUTIONAL: Well-developed; well-nourished; in no acute distress.  SKIN: Skin exam is warm and dry, no acute rash, good turgor  HEAD: Normocephalic; atraumatic.  EYES: PERRL, EOM intact; conjunctiva and sclera clear, no proptosis  ENT: No nasal discharge; airway clear. TMs clear.  NECK: Supple; non tender.  CARD: tachy, regular rhythm  RESP: No wheezes, rales or rhonchi.  ABD: Normal bowel sounds; soft; non-distended; non-tender  EXT: Normal ROM. No clubbing, cyanosis or edema, no calf swelling  NEURO: Alert, oriented. Grossly unremarkable. No focal deficits.  PSYCH: Cooperative, appropriate.

## 2021-02-24 NOTE — ED PROVIDER NOTE - CLINICAL SUMMARY MEDICAL DECISION MAKING FREE TEXT BOX
pt presented for palpitations, chest pain, sob, heart rate in 80's, feeling better at this time, aware of all results including incidental findings, repeat cbc with white count approved after fluids, no signs of infection, pmd aware of all results, will follow thyroid testing, pt aware of signs and symptoms to return for, will follow up.   Shared decision making utilized and HEART score discussed.  Patient electing for outpatient management and will follow up immediately with cardiology.  Risk of MACE discussed and patient understands this risk. Strict return precautions discussed and patient aware they can return at any time for re-evaluation and possible admission. EKG and results discussed.

## 2021-02-24 NOTE — ED PROVIDER NOTE - PATIENT PORTAL LINK FT
You can access the FollowMyHealth Patient Portal offered by [facility] by registering at the following website: [email or web address]. By joining [facility]’s FollowMyHealth portal, you will also be able to view your health information using other applications (apps) compatible with our system.

## 2021-02-24 NOTE — ED ADULT TRIAGE NOTE - CHIEF COMPLAINT QUOTE
Pt c/o left rib pain that feels "stabbing" and rapid heart rate; pt states she woke up this way and has been happening intermittently., Pt c/o left rib pain that feels "stabbing" and rapid heart rate; pt states she woke up this way and has been happening intermittently.,  pt denies chest pain/SOB

## 2021-02-24 NOTE — ED PROVIDER NOTE - PROGRESS NOTE DETAILS
Em: Case signed out to Dr. Bustos ED Attending NIMA Bustos  21 y/o f w/ no sig pmhx, no family hx of cardiac disease at young age, not a smoker, no family hx of thyroid disease presents with L chest pain, woe her up from sleep this  morning, described as sharp jolting pain, intermittent, non radiating, associated with palpitations, sob on exertion, currently no chest pain, lmp ~ 2 weeks ago, reports lost ~ 8 lbs in January 2/2 to keto diet, no drug use, or etoh abuse. resting comfortably in nad at this time with no complaints, states had rapid COVID testing yesterday that was negative, (+) on OCP's.   No fever, chills, n/v, diaphoresis, cough, ha/lh/dizziness, numbness/tingling, neck pain/ stiffness, abd pain, diarrhea, constipation, melena/brbpr, urinary symptoms, trauma, weakness, edema, calf pain/swelling/erythema, sick contacts, recent travel or rash.  Vital Signs: I have reviewed the initial vital signs. Constitutional: WDWN in nad. Sitting on stretcher speaking full sentences. Integumentary: No rash.  EYES: No exophthalmus. NECK: No goiter. ENT: MMM NECK: Supple, non-tender, no meningeal signs. Cardiovascular: Tachy, radial pulses 2/4 b/l. No JVD. Respiratory: BS present b/l, ctabl, no wheezing or crackles,  no accessory muscle use, no stridor. Gastrointestinal: BS present throughout all 4 quadrants, soft, nd, nt, no rebound tenderness or guarding, no cvat. Musculoskeletal: FROM, no edema, no calf pain/swelling/erythema. Neurologic: AAOx3, motor 5/5 and sensation intact throughout upper and lower ext, CN II-XII intact, No facial droop or slurring of speech. No focal deficits.    white counte 16.89, ekg sinus tach, no signs of infection at this time, pending ua, rest of labs, d dimer elevated, pending cta chest for PE evaluation, will continue to monitor and reassess. pt aware of plan. ED Attending Juli, S  discussion had with pt's pmd Dr. Elizabeth, aware of pt and all results, including ct imaging results and incidental findings on nodule and possible lymphadenopathy and requiring repeat imaging, thryoid testing all sent, pt aware of these results with copy provided and extensive conversation had ,  reports will follow up with pt and thryoid testing results, pt aware and agrees. ED Attending NIMA Bustos  repeat cbc 12.78 , pt aware of signs and symptoms to return for, will follow up with pmd and discussed.

## 2021-02-24 NOTE — CHART NOTE - NSCHARTNOTEFT_GEN_A_CORE
SW spoke to pt in the ED.  Pt came to ED due to chest pain.   Pt's PCP Dr. WINSTON Elizabeth.  Pt currently has no home care needs.  SW provided pt with needed referrals in regards to pt's social issues.  SW provided pt with supportive counseling.  Case Management will remain available if needs present prior to d/c.

## 2021-02-24 NOTE — ED PROVIDER NOTE - NSFOLLOWUPINSTRUCTIONS_ED_ALL_ED_FT
Chest Pain    Chest pain can be caused by many different conditions which may or may not be dangerous. Causes include heartburn, lung infections, heart attack, blood clot in lungs, skin infections, strain or damage to muscle, cartilage, or bones, etc. In addition to a history and physical examination, an electrocardiogram (ECG) or other lab tests may have been performed to determine the cause of your chest pain. Follow up with your primary care provider or with a cardiologist as instructed.     SEEK IMMEDIATE MEDICAL CARE IF YOU HAVE ANY OF THE FOLLOWING SYMPTOMS: worsening chest pain, coughing up blood, unexplained back/neck/jaw pain, severe abdominal pain, dizziness or lightheadedness, fainting, shortness of breath, sweaty or clammy skin, vomiting, or racing heart beat. These symptoms may represent a serious problem that is an emergency. Do not wait to see if the symptoms will go away. Get medical help right away. Call 911 and do not drive yourself to the hospital.    Palpitations    A palpitation is the feeling that your heartbeat is irregular or is faster than normal. It may feel like your heart is fluttering or skipping a beat. They may be caused by many things, including smoking, caffeine, alcohol, stress, and certain medicines. Although most causes of palpitations are not serious, palpitations can be a sign of a serious medical problem. Avoid caffeine, alcohol, tobacco products at home. Try to reduce stress and anxiety, and make sure to get plenty of rest.     SEEK IMMEDIATE MEDICAL CARE IF YOU HAVE THE FOLLOWING SYMPTOMS: chest pain, shortness of breath, severe headache, or dizziness/lightheadedness.    Pulmonary Nodule  A pulmonary nodule is a small, round growth of tissue in the lung. It is sometimes referred to as a "shadow" or "spot on the lung." Nodules range in size from less than 1/5 of an inch (4 mm) to a little bigger than an inch (30 mm).    Pulmonary nodules can be either noncancerous (benign) or cancerous (malignant). Most are noncancerous. Smaller nodules in people who do not smoke and do not have any other risk factors for lung cancer are more likely to be noncancerous. Larger, irregular nodules in people who smoke or who have a strong family history of lung cancer are more likely to be cancerous.    What are the causes?  This condition may be caused by:    A bacterial, fungal, or viral infection, such as tuberculosis. The infection is usually an old and inactive one.  A noncancerous mass of tissue.  Inflammation from conditions such as rheumatoid arthritis.  Abnormal blood vessels in the lungs.  Cancerous tissue, such as lung cancer or a cancer in another part of the body that has spread to the lung.    What are the signs or symptoms?  This condition usually does not cause symptoms. If symptoms appear, they are usually related to the underlying cause. For example, if the condition is caused by an infection, you may have a cough or fever.    How is this diagnosed?  This condition is usually diagnosed with an X-ray or CT scan. To help determine whether a pulmonary nodule is benign or malignant, your health care provider will:    Take your medical history.  Perform a physical exam.  Order tests, including:    Blood tests.  A skin test called a tuberculin test. This test is done to check if you have been exposed to the germ that causes tuberculosis.  Chest X-rays.  A CT scan. This test shows smaller pulmonary nodules more clearly and with more detail than an X-ray.  A positron emission tomography (PET) scan. This test is done to check if the nodule is cancerous. During the test, a safe amount of a radioactive substance is injected into the bloodstream. Then a picture is taken.  Biopsy. In this test, a tiny piece of the pulmonary nodule is removed and then examined under a microscope.      How is this treated?  Treatment for this condition depends on whether the pulmonary nodule is malignant or benign as well as your risk of getting cancer.    Noncancerous nodules usually do not need to be treated, but they may need to be monitored with CT scans. If a CT scan shows that the pulmonary nodule got bigger, more tests may be done.  Some nodules need to be removed. If this is the case, you may have a procedure called a thoractomy. During the procedure, your health care provider will make an incision in your chest and remove the part of the lung where the nodule is located.    Follow these instructions at home:  Take over-the-counter and prescription medicines only as told by your health care provider.  Do not use any products that contain nicotine or tobacco, such as cigarettes and e-cigarettes. If you need help quitting, ask your health care provider.  ImageKeep all follow-up visits as told by your health care provider. This is important.    Contact a health care provider if:  You have trouble breathing when you are active.  You feel sick or unusually tired.  You do not feel like eating.  You lose weight without trying.  You develop chills or night sweats.  Get help right away if:  You cannot catch your breath.  You begin wheezing.  You cannot stop coughing.  You cough up blood.  You become dizzy or feel like you are going to faint.  You have sudden chest pain.  You have a fever or persistent symptoms for more than 2–3 days.  You have a fever and your symptoms suddenly get worse.    Summary  A pulmonary nodule is a small, round growth of tissue in the lung. Most pulmonary nodules are noncancerous.  This condition is usually diagnosed with an X-ray or CT scan.  Common causes of pulmonary nodules include infection, inflammation, and noncancerous growths.  Though less common, if a nodule is found to be cancerous, you will need specific diagnostic tests and treatment options as directed by your medical provider.  Treatment for this condition depends on whether the pulmonary nodule is benign or malignant as well as your risk of getting cancer.  Lymphadenopathy    Lymphadenopathy means that your lymph glands are swollen or larger than normal (enlarged). Lymph glands, also called lymph nodes, are collections of tissue that filter bacteria, viruses, and waste from your bloodstream. They are part of your body's disease-fighting system (immune system), which protects your body from germs.    There may be different causes of lymphadenopathy, depending on where it is in your body. Some types go away on their own. Lymphadenopathy can occur anywhere that you have lymph glands, including these areas:  Neck (cervical lymphadenopathy).  Chest (mediastinal lymphadenopathy).  Lungs (hilar lymphadenopathy).  Underarms (axillary lymphadenopathy).  Groin (inguinal lymphadenopathy).  When your immune system responds to germs, infection-fighting cells and fluid build up in your lymph glands. This causes some swelling and enlargement. If the lymph glands do not go back to normal after you have an infection or disease, your health care provider may do tests. These tests help to monitor your condition and find the reason why the glands are still swollen and enlarged.    Follow these instructions at home:  Get plenty of rest.  Take over-the-counter and prescription medicines only as told by your health care provider. Your health care provider may recommend over-the-counter medicines for pain.  If directed, apply heat to swollen lymph glands as often as told by your health care provider. Use the heat source that your health care provider recommends, such as a moist heat pack or a heating pad.  Place a towel between your skin and the heat source.   Leave the heat on for 20–30 minutes.   Remove the heat if your skin turns bright red. This is especially important if you are unable to feel pain, heat, or cold. You may have a greater risk of getting burned.  Check your affected lymph glands every day for changes. Check other lymph gland areas as told by your health care provider. Check for changes such as:  More swelling.  Sudden increase in size.  Redness or pain.  Hardness.  Keep all follow-up visits as told by your health care provider. This is important.  Contact a health care provider if you have:  Swelling that gets worse or spreads to other areas.  Problems with breathing.  Lymph glands that:  Are still swollen after 2 weeks.  Have suddenly gotten bigger.  Are red, painful, or hard.  A fever or chills.  Fatigue.  A sore throat.  Pain in your abdomen.  Weight loss.  Night sweats.  Get help right away if you have:  Fluid leaking from an enlarged lymph gland.  Severe pain.  Chest pain.  Shortness of breath.  Summary  Lymphadenopathy means that your lymph glands are swollen or larger than normal (enlarged).  Lymph glands (also called lymph nodes) are collections of tissue that filter bacteria, viruses, and waste from the bloodstream. They are part of your body's disease-fighting system (immune system).  Lymphadenopathy can occur anywhere that you have lymph glands.  If your enlarged and swollen lymph glands do not go back to normal after you have an infection or disease, your health care provider may do tests to monitor your condition and find the reason why the glands are still swollen and enlarged.  Check your affected lymph glands every day for changes. Check other lymph gland areas as told by your health care provider.  This information is not intended to replace advice given to you by your health care provider. Make sure you discuss any questions you have with your health care provider.

## 2021-02-24 NOTE — ED ADULT NURSE REASSESSMENT NOTE - NS ED NURSE REASSESS COMMENT FT1
pt alert and oriented x4. denies chest pain, states she had palpitations earlier that have improved.  upon assessment.

## 2021-02-24 NOTE — ED ADULT NURSE NOTE - CHIEF COMPLAINT QUOTE
Pt c/o left rib pain that feels "stabbing" and rapid heart rate; pt states she woke up this way and has been happening intermittently.,

## 2021-02-25 LAB
T3 SERPL-MCNC: 168 NG/DL — SIGNIFICANT CHANGE UP (ref 80–200)
T4 AB SER-ACNC: 10.9 UG/DL — SIGNIFICANT CHANGE UP (ref 4.6–12)
TSH SERPL-MCNC: 0.73 UIU/ML — SIGNIFICANT CHANGE UP (ref 0.27–4.2)

## 2021-02-26 ENCOUNTER — EMERGENCY (EMERGENCY)
Facility: HOSPITAL | Age: 21
LOS: 0 days | Discharge: HOME | End: 2021-02-26
Attending: EMERGENCY MEDICINE | Admitting: EMERGENCY MEDICINE
Payer: MEDICAID

## 2021-02-26 VITALS
HEART RATE: 109 BPM | OXYGEN SATURATION: 100 % | WEIGHT: 121.92 LBS | TEMPERATURE: 99 F | RESPIRATION RATE: 18 BRPM | SYSTOLIC BLOOD PRESSURE: 121 MMHG | DIASTOLIC BLOOD PRESSURE: 75 MMHG | HEIGHT: 62 IN

## 2021-02-26 DIAGNOSIS — R07.89 OTHER CHEST PAIN: ICD-10-CM

## 2021-02-26 DIAGNOSIS — R00.2 PALPITATIONS: ICD-10-CM

## 2021-02-26 LAB
CULTURE RESULTS: SIGNIFICANT CHANGE UP
SPECIMEN SOURCE: SIGNIFICANT CHANGE UP

## 2021-02-26 PROCEDURE — 99284 EMERGENCY DEPT VISIT MOD MDM: CPT

## 2021-02-26 NOTE — ED PROVIDER NOTE - CLINICAL SUMMARY MEDICAL DECISION MAKING FREE TEXT BOX
20y female no cardiac rf with palpitations and sharp left inframammary CP which awoke her from sleep identical to episode 3d ago for which she was seen at the [place] and had extensive testing including PE study, took no meds pta, on exam vital signs appreciated, nontoxic, head nc/at, perrla, EOMI, conj pink op clear neck supple cor tachy, reg, lungs cta abd snt no c/c/e pulses equal calves nontender neuro intact ekg reviewed, also TFTs normal, do not suspect cardiac disease, does not require repeat imaging, has cardiology followup, recommend take tylenol or motrin for pain and f/u as scheduled. Patient counseled regarding conditions which should prompt return.

## 2021-02-26 NOTE — ED PROVIDER NOTE - CARE PROVIDER_API CALL
Hesham Pineda  CARDIOVASCULAR DISEASE  [address]  [place] [address]  Phone: [phone]  Fax: [phone]  Follow Up Time:

## 2021-02-26 NOTE — ED PROVIDER NOTE - PATIENT PORTAL LINK FT
You can access the FollowMyHealth Patient Portal offered by [facility] by registering at the following website: [email or web address]. By joining [employer]’s FollowMyHealth portal, you will also be able to view your health information using other applications (apps) compatible with our system.

## 2021-02-26 NOTE — ED PROVIDER NOTE - PHYSICAL EXAMINATION
Physical Exam    Vital Signs: I have reviewed the initial vital signs.  Constitutional: well-nourished, appears stated age, no acute distress  Eyes: Conjunctiva pink, Sclera clear  Cardiovascular: S1 and S2, regular rate, regular rhythm, well-perfused extremities, radial pulses equal and 2+ b/l.   Respiratory: unlabored respiratory effort, clear to auscultation bilaterally no wheezing, rales and rhonchi. pt is speaking full sentences. no accessory muscle use. no reproducible chest tenderness or chest wall crepitus.   Gastrointestinal: soft, non-tender, nondistended abdomen, no pulsatile mass, normal bowl sounds, no rebound, no guarding  Musculoskeletal: supple neck, no lower extremity edema, no calf tenderness, no midline tenderness, no palpable spinal step offs  Integumentary: warm, dry, no rash  Neurologic: awake, alert.   Psychiatric: appropriate mood, appropriate affect

## 2021-02-26 NOTE — ED ADULT NURSE NOTE - NSIMPLEMENTINTERV_GEN_ALL_ED
Implemented All Universal Safety Interventions:  [place] to call system. Call bell, personal items and telephone within reach. Instruct patient to call for assistance. Room bathroom lighting operational. Non-slip footwear when patient is off stretcher. Physically safe environment: no spills, clutter or unnecessary equipment. Stretcher in lowest position, wheels locked, appropriate side rails in place.

## 2021-02-26 NOTE — ED PROVIDER NOTE - ATTENDING CONTRIBUTION TO CARE
20y female no cardiac rf with palpitations and sharp left inframammary CP which awoke her from sleep identical to episode 3d ago for which she was seen at the [facility] and had extensive testing including PE study, took no meds pta, on exam vital signs appreciated, nontoxic, head nc/at, perrla, EOMI, conj pink op clear neck supple cor tachy, reg, lungs cta abd snt no c/c/e pulses equal calves nontender neuro intact ekg reviewed, also TFTs normal, do not suspect cardiac disease, does not require repeat imaging, has cardiology followup, recommend take tylenol or motrin for pain and f/u as scheduled. Patient counseled regarding conditions which should prompt return.

## 2021-02-26 NOTE — ED PROVIDER NOTE - OBJECTIVE STATEMENT
21 y/o female with no significant PMH presents to the ED for evaluation of left lower sharp nonradiating chest pain below her breast and rapid heart rate that began around 4:15AM which has improved since the onset. pt denies alleviating or worsening factors. pt was seen in the ED 2/24/2021 for the same symptoms and blood work was performed CTA performed negative for PE. pt has an appt with cardiologist Dr. Scott 03/09/2021. pt denies headache, dizziness, sob, back pain, neck pain, jaw pain, sweating, arm pain, fever, chills, cough, abdominal pain, n/v/d/c, leg pain, leg swelling, recent travel, recent trauma, recent hospitalizations, recent surgeries, hx of cancer, hx of clotting disorder, fhx of blood clot or mi, fhx of sudden cardiac death, recent viral illness, cigarette smoking, dizziness, use of hormones or steroids,  recent pregnancy, illicit drug use, thyroid abnormalities, excessive alcohol use, breast pain, numbness, tingling, weakness, or rashes.

## 2021-04-25 ENCOUNTER — OUTPATIENT (OUTPATIENT)
Dept: OUTPATIENT SERVICES | Facility: HOSPITAL | Age: 21
LOS: 1 days | Discharge: HOME | End: 2021-04-25
Payer: MEDICAID

## 2021-04-25 DIAGNOSIS — R22.1 LOCALIZED SWELLING, MASS AND LUMP, NECK: ICD-10-CM

## 2021-04-25 DIAGNOSIS — R59.0 LOCALIZED ENLARGED LYMPH NODES: ICD-10-CM

## 2021-04-25 PROCEDURE — 70491 CT SOFT TISSUE NECK W/DYE: CPT | Mod: 26

## 2021-04-25 PROCEDURE — 71260 CT THORAX DX C+: CPT | Mod: 26

## 2021-05-23 ENCOUNTER — TRANSCRIPTION ENCOUNTER (OUTPATIENT)
Age: 21
End: 2021-05-23

## 2021-06-15 ENCOUNTER — TRANSCRIPTION ENCOUNTER (OUTPATIENT)
Age: 21
End: 2021-06-15

## 2021-12-13 ENCOUNTER — TRANSCRIPTION ENCOUNTER (OUTPATIENT)
Age: 21
End: 2021-12-13

## 2022-01-13 ENCOUNTER — OUTPATIENT (OUTPATIENT)
Dept: OUTPATIENT SERVICES | Facility: HOSPITAL | Age: 22
LOS: 1 days | Discharge: HOME | End: 2022-01-13
Payer: MEDICAID

## 2022-01-13 DIAGNOSIS — R59.0 LOCALIZED ENLARGED LYMPH NODES: ICD-10-CM

## 2022-01-13 PROCEDURE — 76856 US EXAM PELVIC COMPLETE: CPT | Mod: 26

## 2022-01-13 PROCEDURE — 76830 TRANSVAGINAL US NON-OB: CPT | Mod: 26

## 2022-02-03 ENCOUNTER — OUTPATIENT (OUTPATIENT)
Dept: OUTPATIENT SERVICES | Facility: HOSPITAL | Age: 22
LOS: 1 days | Discharge: HOME | End: 2022-02-03
Payer: MEDICAID

## 2022-02-03 DIAGNOSIS — R59.0 LOCALIZED ENLARGED LYMPH NODES: ICD-10-CM

## 2022-02-03 PROCEDURE — 72193 CT PELVIS W/DYE: CPT | Mod: 26

## 2022-02-10 ENCOUNTER — OUTPATIENT (OUTPATIENT)
Dept: OUTPATIENT SERVICES | Facility: HOSPITAL | Age: 22
LOS: 1 days | Discharge: HOME | End: 2022-02-10
Payer: MEDICAID

## 2022-02-10 DIAGNOSIS — J98.4 OTHER DISORDERS OF LUNG: ICD-10-CM

## 2022-02-10 PROCEDURE — 74177 CT ABD & PELVIS W/CONTRAST: CPT | Mod: 26

## 2022-02-10 PROCEDURE — 71260 CT THORAX DX C+: CPT | Mod: 26

## 2022-02-14 ENCOUNTER — OUTPATIENT (OUTPATIENT)
Dept: OUTPATIENT SERVICES | Facility: HOSPITAL | Age: 22
LOS: 1 days | Discharge: HOME | End: 2022-02-14

## 2022-02-14 VITALS
WEIGHT: 111.99 LBS | HEART RATE: 100 BPM | RESPIRATION RATE: 17 BRPM | HEIGHT: 61 IN | OXYGEN SATURATION: 100 % | DIASTOLIC BLOOD PRESSURE: 75 MMHG | SYSTOLIC BLOOD PRESSURE: 113 MMHG | TEMPERATURE: 98 F

## 2022-02-14 DIAGNOSIS — R59.1 GENERALIZED ENLARGED LYMPH NODES: ICD-10-CM

## 2022-02-14 DIAGNOSIS — Z01.818 ENCOUNTER FOR OTHER PREPROCEDURAL EXAMINATION: ICD-10-CM

## 2022-02-14 LAB
ALBUMIN SERPL ELPH-MCNC: 3.7 G/DL — SIGNIFICANT CHANGE UP (ref 3.5–5.2)
ALP SERPL-CCNC: 125 U/L — HIGH (ref 30–115)
ALT FLD-CCNC: 15 U/L — SIGNIFICANT CHANGE UP (ref 0–41)
ANION GAP SERPL CALC-SCNC: 16 MMOL/L — HIGH (ref 7–14)
APTT BLD: 32.5 SEC — SIGNIFICANT CHANGE UP (ref 27–39.2)
AST SERPL-CCNC: 13 U/L — SIGNIFICANT CHANGE UP (ref 0–41)
BASOPHILS # BLD AUTO: 0.03 K/UL — SIGNIFICANT CHANGE UP (ref 0–0.2)
BASOPHILS NFR BLD AUTO: 0.2 % — SIGNIFICANT CHANGE UP (ref 0–1)
BILIRUB SERPL-MCNC: <0.2 MG/DL — SIGNIFICANT CHANGE UP (ref 0.2–1.2)
BUN SERPL-MCNC: 13 MG/DL — SIGNIFICANT CHANGE UP (ref 10–20)
CALCIUM SERPL-MCNC: 9.2 MG/DL — SIGNIFICANT CHANGE UP (ref 8.5–10.1)
CHLORIDE SERPL-SCNC: 98 MMOL/L — SIGNIFICANT CHANGE UP (ref 98–110)
CO2 SERPL-SCNC: 24 MMOL/L — SIGNIFICANT CHANGE UP (ref 17–32)
CREAT SERPL-MCNC: 0.6 MG/DL — LOW (ref 0.7–1.5)
EOSINOPHIL # BLD AUTO: 0.12 K/UL — SIGNIFICANT CHANGE UP (ref 0–0.7)
EOSINOPHIL NFR BLD AUTO: 0.7 % — SIGNIFICANT CHANGE UP (ref 0–8)
GLUCOSE SERPL-MCNC: 85 MG/DL — SIGNIFICANT CHANGE UP (ref 70–99)
HCT VFR BLD CALC: 32.6 % — LOW (ref 37–47)
HGB BLD-MCNC: 9.7 G/DL — LOW (ref 12–16)
IMM GRANULOCYTES NFR BLD AUTO: 0.5 % — HIGH (ref 0.1–0.3)
INR BLD: 1.14 RATIO — SIGNIFICANT CHANGE UP (ref 0.65–1.3)
LYMPHOCYTES # BLD AUTO: 13.6 % — LOW (ref 20.5–51.1)
LYMPHOCYTES # BLD AUTO: 2.27 K/UL — SIGNIFICANT CHANGE UP (ref 1.2–3.4)
MCHC RBC-ENTMCNC: 23.2 PG — LOW (ref 27–31)
MCHC RBC-ENTMCNC: 29.8 G/DL — LOW (ref 32–37)
MCV RBC AUTO: 78 FL — LOW (ref 81–99)
MONOCYTES # BLD AUTO: 0.65 K/UL — HIGH (ref 0.1–0.6)
MONOCYTES NFR BLD AUTO: 3.9 % — SIGNIFICANT CHANGE UP (ref 1.7–9.3)
NEUTROPHILS # BLD AUTO: 13.49 K/UL — HIGH (ref 1.4–6.5)
NEUTROPHILS NFR BLD AUTO: 81.1 % — HIGH (ref 42.2–75.2)
NRBC # BLD: 0 /100 WBCS — SIGNIFICANT CHANGE UP (ref 0–0)
PLATELET # BLD AUTO: 505 K/UL — HIGH (ref 130–400)
POTASSIUM SERPL-MCNC: 4.6 MMOL/L — SIGNIFICANT CHANGE UP (ref 3.5–5)
POTASSIUM SERPL-SCNC: 4.6 MMOL/L — SIGNIFICANT CHANGE UP (ref 3.5–5)
PROT SERPL-MCNC: 8.1 G/DL — HIGH (ref 6–8)
PROTHROM AB SERPL-ACNC: 13.1 SEC — HIGH (ref 9.95–12.87)
RBC # BLD: 4.18 M/UL — LOW (ref 4.2–5.4)
RBC # FLD: 15.7 % — HIGH (ref 11.5–14.5)
SODIUM SERPL-SCNC: 138 MMOL/L — SIGNIFICANT CHANGE UP (ref 135–146)
WBC # BLD: 16.64 K/UL — HIGH (ref 4.8–10.8)
WBC # FLD AUTO: 16.64 K/UL — HIGH (ref 4.8–10.8)

## 2022-02-14 NOTE — H&P PST ADULT - NSICDXFAMILYHX_GEN_ALL_CORE_FT
FAMILY HISTORY:  Father  Still living? No  Family history of diabetes mellitus (DM), Age at diagnosis: Age Unknown    Mother  Still living? Yes, Estimated age: Age Unknown  FH: HTN (hypertension), Age at diagnosis: Age Unknown

## 2022-02-14 NOTE — H&P PST ADULT - REASON FOR ADMISSION
20 Y/O FEMALE HERE FOR PRE-ADMISSION SURGICAL TESTING. PATIENT REPORTS SHE NOTICED AN ENLARGED LYMPH NODE TO HER LEFT NECL 1 YR AGO. AFTER HER SECOND NO COUGH, FEVER, SORE THROAT, HEADACHE, LOSS OF TASTE OR SMELL. NO KNOWN EXPOSURE TO ANYONE WITH COVID. PATIENT WAS INSTRUCTED TO ISOLATE FROM NOW UNTIL THE SURGERY. VACCINE, SHE NOTICED SHE HAD ENLARGED LYMPH NODES TO HER RIGHT GROIN. CT SCAN REVEALED + PELVIC MASSES. SHE WAS ALSO HAVING B/L HIP PAIN X1 YR AND NIGHT SWEATS.  NOW FOR SCHEDULED PROCEDURE.

## 2022-02-14 NOTE — H&P PST ADULT - HISTORY OF PRESENT ILLNESS
PATIENT DENIES CHEST PAIN, SHORTNESS OF BREATH, PALPITATIONS, COUGHING, FEVER, DYSURIA.  CAN WALK UP 2-3 FLIGHTS OF STEPS WITHOUT SOB.    NO COUGH, FEVER, SORE THROAT, HEADACHE, LOSS OF TASTE OR SMELL. NO KNOWN EXPOSURE TO ANYONE WITH COVID. PATIENT WAS INSTRUCTED TO ISOLATE FROM NOW UNTIL THE SURGERY.  FULLY VACCINATED AND BOOSTED    Anesthesia Alert  NO--Difficult Airway  NO--History of neck surgery or radiation  NO--Limited ROM of neck  NO--History of Malignant hyperthermia  NO--Personal or family history of Pseudocholinesterase deficiency  NO--Prior Anesthesia Complication  NO--Latex Allergy  NO--Loose teeth  NO--History of Rheumatoid Arthritis  NO--CURTIS  NO--BLEEDING RISK

## 2022-02-15 ENCOUNTER — OUTPATIENT (OUTPATIENT)
Dept: OUTPATIENT SERVICES | Facility: HOSPITAL | Age: 22
LOS: 1 days | Discharge: HOME | End: 2022-02-15
Payer: MEDICAID

## 2022-02-15 ENCOUNTER — APPOINTMENT (OUTPATIENT)
Dept: INTERVENTIONAL RADIOLOGY/VASCULAR | Facility: CLINIC | Age: 22
End: 2022-02-15
Payer: MEDICAID

## 2022-02-15 PROCEDURE — 99241 OFFICE CONSULTATION NEW/ESTAB PATIENT 15 MIN: CPT

## 2022-02-15 PROCEDURE — 70491 CT SOFT TISSUE NECK W/DYE: CPT | Mod: 26

## 2022-02-15 PROCEDURE — 99072 ADDL SUPL MATRL&STAF TM PHE: CPT

## 2022-02-15 PROCEDURE — 99202 OFFICE O/P NEW SF 15 MIN: CPT

## 2022-02-15 NOTE — DATA REVIEWED
[FreeTextEntry1] : Images from CT Abdomen and Pelvis performed at [facility] on 2/10/22 were reviewed with patient by Dr. Barnes during video call.

## 2022-02-15 NOTE — HISTORY OF PRESENT ILLNESS
[FreeTextEntry1] : 22 y/o female referred to IR by Dr. Varsha Briones (Oncolcogy) for Biopsy of Right Groin Lymph Node as identified on CT of the Abdomen and Pelvis done at  [facility] on 2/10/22

## 2022-02-15 NOTE — ASSESSMENT
[FreeTextEntry1] : Details of the percutaneous right groin (inguinal) lymph node biopsy scheduled for 2/17/22 were discussed with the patient including the risks and benefits. The patient presented a series of well organized questions which were answered to her satisfaction by Dr. Barnes. She will follow up with Dr. Briones to discuss the biopsy results within one week after the procedure.

## 2022-02-17 ENCOUNTER — OUTPATIENT (OUTPATIENT)
Dept: OUTPATIENT SERVICES | Facility: HOSPITAL | Age: 22
LOS: 1 days | Discharge: HOME | End: 2022-02-17
Payer: MEDICAID

## 2022-02-17 ENCOUNTER — RESULT REVIEW (OUTPATIENT)
Age: 22
End: 2022-02-17

## 2022-02-17 PROCEDURE — 38505 NEEDLE BIOPSY LYMPH NODES: CPT

## 2022-02-17 PROCEDURE — 88342 IMHCHEM/IMCYTCHM 1ST ANTB: CPT | Mod: 26,59

## 2022-02-17 PROCEDURE — 88173 CYTOPATH EVAL FNA REPORT: CPT | Mod: 26,59

## 2022-02-17 PROCEDURE — 88360 TUMOR IMMUNOHISTOCHEM/MANUAL: CPT | Mod: 26

## 2022-02-17 PROCEDURE — 88341 IMHCHEM/IMCYTCHM EA ADD ANTB: CPT | Mod: 26,59

## 2022-02-17 PROCEDURE — 88189 FLOWCYTOMETRY/READ 16 & >: CPT

## 2022-02-17 PROCEDURE — 76942 ECHO GUIDE FOR BIOPSY: CPT | Mod: 26

## 2022-02-17 PROCEDURE — 88365 INSITU HYBRIDIZATION (FISH): CPT | Mod: 26

## 2022-02-17 PROCEDURE — 88307 TISSUE EXAM BY PATHOLOGIST: CPT | Mod: 26

## 2022-02-17 PROCEDURE — 88172 CYTP DX EVAL FNA 1ST EA SITE: CPT | Mod: 26,59

## 2022-02-17 NOTE — PROGRESS NOTE ADULT - SUBJECTIVE AND OBJECTIVE BOX
INTERVENTIONAL RADIOLOGY BRIEF-OPERATIVE NOTE    Procedure: Right inguinal lymph node    Pre-Op Diagnosis: bone lesions and right inguinal lymphadenopathy    Post-Op Diagnosis: same as pre     Attending: Sriram Bradford  Resident: Carolyne    Anesthesia (type):  [ ] General Anesthesia  [ ] Deep Sedation - provided by anesthesiologist  [x ] Conscious Sedation -  1.5mg Versed and 100mcg Fentanyl   [x ] Local/Regional    Contrast: 0    Estimated Blood Loss: 0    Condition:   [ ] Critical  [ ] Serious  [ ] Fair   [ x] Good    Findings/Follow up Plan of Care: Initial CT redemonstrated previously seen right inguinal LN. US was used to target the lymph node.     Specimens Removed: 18G core, and 25G fine needle.     Implants: none    Complications: no immediate.     Disposition: recovery then home.       Please call Interventional Radiology [phone] with any questions, concerns, or issues.

## 2022-02-22 LAB — TM INTERPRETATION: SIGNIFICANT CHANGE UP

## 2022-02-24 DIAGNOSIS — J98.4 OTHER DISORDERS OF LUNG: ICD-10-CM

## 2022-02-24 LAB
CHROM ANALY OVERALL INTERP SPEC-IMP: SIGNIFICANT CHANGE UP
NON-GYNECOLOGICAL CYTOLOGY STUDY: SIGNIFICANT CHANGE UP
SURGICAL PATHOLOGY STUDY: SIGNIFICANT CHANGE UP

## 2022-02-28 ENCOUNTER — OUTPATIENT (OUTPATIENT)
Dept: OUTPATIENT SERVICES | Facility: HOSPITAL | Age: 22
LOS: 1 days | Discharge: HOME | End: 2022-02-28

## 2022-02-28 ENCOUNTER — APPOINTMENT (OUTPATIENT)
Dept: HEMATOLOGY ONCOLOGY | Facility: CLINIC | Age: 22
End: 2022-02-28
Payer: MEDICAID

## 2022-02-28 VITALS
WEIGHT: 115 LBS | TEMPERATURE: 98 F | HEART RATE: 100 BPM | BODY MASS INDEX: 20.38 KG/M2 | RESPIRATION RATE: 18 BRPM | SYSTOLIC BLOOD PRESSURE: 108 MMHG | DIASTOLIC BLOOD PRESSURE: 65 MMHG | HEIGHT: 63 IN

## 2022-02-28 DIAGNOSIS — R59.0 LOCALIZED ENLARGED LYMPH NODES: ICD-10-CM

## 2022-02-28 PROCEDURE — 99205 OFFICE O/P NEW HI 60 MIN: CPT

## 2022-03-04 NOTE — ASSESSMENT
[FreeTextEntry1] : In summary, Melissa is a 21 year old female with no significant PMHx who presents for classic Hodgkin lymphoma, Stage IV.\par \par I have prepared the note after I have reviewed the previous notes, reviewed the radiological and laboratory studies and have communicated those with the patient and her mother. I discussed the natural history, prognosis and treatment of Stage IV classic Hodgkin lymphoma. I reviewed the pathology report with them in detail.\par \par We discussed her treatment options, specifically BV-AVD vs ABVD. As per the ECHELON trial, both regimens have similar OS, however ABVD has much longer data supporting its long term survival benefits. Additionally, the side effect profiles are different. The former regimen has increased cytopenias and risk of febrile neutropenia, which requires growth factors. Additionally, it has a higher risk of peripheral neuropathy. ABVD has a higher risk of pulmonary toxicity, however the patient is a non-smoker so the risk is less significant. We also discussed additional side effects of both regimens including but not limited to nausea, vomiting, diarrhea, cardiac dysfunction, bleeding, fatigue and hair loss. Both regimens are listed as first line options per NCCN and both would be reasonable options for Mleissa.\par \par She is already scheduled for a port and PET scan through her other hematologist, and she states she already had the lab workup performed as well. We will try to get records. Additionally, she will need an echo before starting treatment. \par \par She will discuss with her mom her options regarding the choice of regimen and whether to receive her treatment here or with her other hematologist. \par \par She was informed to call the office if she has any further questions and if she decides to pursue treatment at [facility]. \par \par \par

## 2022-03-04 NOTE — PHYSICAL EXAM
[Fully active, able to carry on all pre-disease performance without restriction] : Status 0 - Fully active, able to carry on all pre-disease performance without restriction [Normal] : affect appropriate [de-identified] : [place] left cervical lymph node is palpable [de-identified] : Right inguinal palpable lymph nodes

## 2022-03-04 NOTE — REVIEW OF SYSTEMS
[Night Sweats] : night sweats [Recent Change In Weight] : ~T recent weight change [Joint Pain] : joint pain [Negative] : Allergic/Immunologic [Swollen Glands] : swollen glands [de-identified] : pruritus [de-identified] : P

## 2022-03-04 NOTE — RESULTS/DATA
[FreeTextEntry1] : CT Chest:\par \par Mildly enlarged 1.5 cm left level IIb lymph node and 2 cm right \par supraclavicular lymph nodes, retrospectively stable since the prior CT \par from 7/25/2021. The finding is nonspecific and may reflect \par infectious/inflammatory or lymphoproliferative etiologies.\par \par Partially imaged mediastinal lymphadenopathy which is better evaluated on \par the recent CT chest from 2/10/2022.\par \par CT Pelvis:\par Ill-defined splenic hypodensities measure up to about 1.8 cm (series 4 \par image 205), suspicious for metastases.\par \par Matted lymphadenopathy seen at the hepatic hilum circumferentially \par encasing the portal vein and common hepatic artery (series 4 image 219). \par Unchanged multilevel rounded retroperitoneal para-aortic lymph nodes \par measuring up to 10 mm (series 7 image 47).\par \par Unchanged pelvic lymph nodes since 7 days earlier.\par \par Multifocal malignant pelvic bone lesions are unchanged since 7 days \par earlier.\par \par CT chest:\par Mediastinal and bilateral hilar lymphadenopathy as above. Anterior \par mediastinal soft tissue mass has slightly decreased in size. Findings are \par concerning for an underlying malignancy including lymphoma. Further \par evaluation is recommended.\par \par Indeterminate 9 mm solid nodule in the right middle lobe, previously 6 \par mm. Further evaluation/follow-up is recommended depending on the results \par of the above recommended investigation.\par \par Spoke with Varsha Briones MD on 2/10/2022 4:50 PM with readback.

## 2022-03-04 NOTE — HISTORY OF PRESENT ILLNESS
[de-identified] : Patient is a 21 year old female with no significant PMHx who presents for classic Hodgkin lymphoma. (no PMHx, no surg, no allergies, FamHx mom anemia, \par \par In January 2020 she first noticed a left cervical lymph node. She had an US performed 12/2020 which showed 2 enlarged lymph nodes, the largest being 2cm. She was referred to an outside hematologist. At that time she just had bloodwork done, told it showed "inflammation" and was instructed to monitor it. Then in January 2021 she developed hip pain, was told it was bursitis and was given meloxicam. Pain improved a little, then she developed pain in the lower back, just told to continue NSAIDs and consider PT. \par \par She then developed right inguinal adenopathy and left axillary adenopathy, this was noticed right after she received the COVID vaccine and she again was told to monitor due to it likely being a reaction from the vaccine. Eventually saw OB, who sent her for a pelvic US which demonstrated pelvic adenopathy. She was then sent for CT CAP and neck - and in summary she has diffuse adenopathy above and below the diaphragm including bilateral mediastinal and hilar lymphadenopathy, an anterior mediastinal mass, a lung lesion  of 9mm, matted lymphadenopathy seen at the hepatic hilum circumferentially encasing the portal vein and common hepatic artery, bilateral iliac, inguinal and para-aortic adenopathy with multicentric soft tissue mass involving the right iliacus, right gluteus and sacrum/parasacral region with invasion of the spinal canal. \par \par She went for inguinal biopsy on 2/17/22 which showed classical Hodgkin lymphoma. She discussed with her outside hematologist who recommended BV-AVD. She presents to us for a second opinion. \par \par She denies lower extremity weakness, incontinence, saddle parasthesia. However, she does report night sweats for about the past 8 months, slight weight loss but just recently and she attributes this to anxiety about her recent diagnosis, and some fatigue. She is also still having significant pain in her lower back and hip.  \par \par She was initially found to have lymphadenopathy at least as far back as 12/2020 seen on neck US which showed 2 enlarged lymph nodes, the laergest 2cm. She had CT chest 2/2021 which showed scattered bilateral hilar and mediastinal afullness, "underlying adenopathy is a consideration". She had another CT chest in April which showed mild unchanged subcarinal and hilar adenopathy . No other imaging until pelvic US 1/2022, which showed pelvic adenopathy (no mention of adenopathy on pelvic US 1 year earlier)\par \par \par CT CAP and neck show diffuse disease above and below the diaphragm with suspicious bone lesions. \par \par \par IMPRESSION:\par \par Multicentric soft tissue mass involving the right illiacus, right gluteus \par medius muscles and the sacrum/parasacral region as detailed above. Sacral \par component demonstrates gross invasion of the spinal canal.\par \par Bilateral iliac chain (up to the aortic bifurcation) and right inguinal \par lymphadenopathy.\par \par Findings are consistent with an aggressive malignant process with right \par inguinal and bilateral iliac chain lymphadenopathy.\par \par Constipation, \par

## 2022-03-04 NOTE — CONSULT LETTER
[Dear  ___] : Dear  [unfilled], [Consult Letter:] : I had the pleasure of evaluating your patient, [unfilled]. [Please see my note below.] : Please see my note below. [Consult Closing:] : Thank you very much for allowing me to participate in the care of this patient.  If you have any questions, please do not hesitate to contact me. [Sincerely,] : Sincerely, [FreeTextEntry3] : Maggi Mann MD\par Professor [employer]\par [place]\par Attending Physician\par [facility] [place]\par [phone]\par

## 2022-03-07 ENCOUNTER — OUTPATIENT (OUTPATIENT)
Dept: OUTPATIENT SERVICES | Facility: HOSPITAL | Age: 22
LOS: 1 days | Discharge: HOME | End: 2022-03-07
Payer: MEDICAID

## 2022-03-07 DIAGNOSIS — C81.90 HODGKIN LYMPHOMA, UNSPECIFIED, UNSPECIFIED SITE: ICD-10-CM

## 2022-03-07 DIAGNOSIS — R06.02 SHORTNESS OF BREATH: ICD-10-CM

## 2022-03-07 PROCEDURE — 93306 TTE W/DOPPLER COMPLETE: CPT | Mod: 26

## 2022-03-08 ENCOUNTER — OUTPATIENT (OUTPATIENT)
Dept: OUTPATIENT SERVICES | Facility: HOSPITAL | Age: 22
LOS: 1 days | Discharge: HOME | End: 2022-03-08
Payer: MEDICAID

## 2022-03-08 DIAGNOSIS — C81.98 HODGKIN LYMPHOMA, UNSPECIFIED, LYMPH NODES OF MULTIPLE SITES: ICD-10-CM

## 2022-03-08 LAB — GLUCOSE BLDC GLUCOMTR-MCNC: 85 MG/DL — SIGNIFICANT CHANGE UP (ref 70–99)

## 2022-03-08 PROCEDURE — 78815 PET IMAGE W/CT SKULL-THIGH: CPT | Mod: 26,PI

## 2022-03-10 ENCOUNTER — NON-APPOINTMENT (OUTPATIENT)
Age: 22
End: 2022-03-10

## 2022-03-17 ENCOUNTER — NON-APPOINTMENT (OUTPATIENT)
Age: 22
End: 2022-03-17

## 2022-06-14 NOTE — ED ADULT NURSE NOTE - TEMPLATE
-- DO NOT REPLY / DO NOT REPLY ALL --  -- Message is from the [facility]--    Transfer to RN      Chief Complaint:  Patient experiencing leg & ankle swelling, recent urine infection, numbness in feet leg vein popping out, declined transfer     Caller Information       Type Contact Phone    06/14/2022 04:51 PM CDT Phone (Incoming) LUCIANA SEGOVIA (Emergency Contact) [phone]          Provider Name:  Dr amelia MOLINA Practice Site Name: [facility] Phone Number:  none    General

## 2022-06-23 ENCOUNTER — NON-APPOINTMENT (OUTPATIENT)
Age: 22
End: 2022-06-23

## 2022-07-22 ENCOUNTER — OUTPATIENT (OUTPATIENT)
Dept: OUTPATIENT SERVICES | Facility: HOSPITAL | Age: 22
LOS: 1 days | Discharge: HOME | End: 2022-07-22

## 2022-07-22 DIAGNOSIS — R93.5 ABNORMAL FINDINGS ON DIAGNOSTIC IMAGING OF OTHER ABDOMINAL REGIONS, INCLUDING RETROPERITONEUM: ICD-10-CM

## 2022-07-22 LAB — GLUCOSE BLDC GLUCOMTR-MCNC: 75 MG/DL — SIGNIFICANT CHANGE UP (ref 70–99)

## 2022-07-22 PROCEDURE — 78815 PET IMAGE W/CT SKULL-THIGH: CPT | Mod: 26,PS

## 2022-09-07 ENCOUNTER — EMERGENCY (EMERGENCY)
Facility: HOSPITAL | Age: 22
LOS: 0 days | Discharge: HOME | End: 2022-09-07
Attending: EMERGENCY MEDICINE | Admitting: EMERGENCY MEDICINE

## 2022-09-07 VITALS
RESPIRATION RATE: 22 BRPM | WEIGHT: 121.92 LBS | OXYGEN SATURATION: 110 % | HEIGHT: 61 IN | SYSTOLIC BLOOD PRESSURE: 140 MMHG | TEMPERATURE: 99 F | DIASTOLIC BLOOD PRESSURE: 79 MMHG | HEART RATE: 110 BPM

## 2022-09-07 DIAGNOSIS — R07.89 OTHER CHEST PAIN: ICD-10-CM

## 2022-09-07 DIAGNOSIS — Z85.72 PERSONAL HISTORY OF NON-HODGKIN LYMPHOMAS: ICD-10-CM

## 2022-09-07 LAB
ALBUMIN SERPL ELPH-MCNC: 4.4 G/DL — SIGNIFICANT CHANGE UP (ref 3.5–5.2)
ALP SERPL-CCNC: 84 U/L — SIGNIFICANT CHANGE UP (ref 30–115)
ALT FLD-CCNC: 15 U/L — SIGNIFICANT CHANGE UP (ref 0–41)
ANION GAP SERPL CALC-SCNC: 10 MMOL/L — SIGNIFICANT CHANGE UP (ref 7–14)
AST SERPL-CCNC: 24 U/L — SIGNIFICANT CHANGE UP (ref 0–41)
BASOPHILS # BLD AUTO: 0.03 K/UL — SIGNIFICANT CHANGE UP (ref 0–0.2)
BASOPHILS NFR BLD AUTO: 0.3 % — SIGNIFICANT CHANGE UP (ref 0–1)
BILIRUB SERPL-MCNC: 0.3 MG/DL — SIGNIFICANT CHANGE UP (ref 0.2–1.2)
BUN SERPL-MCNC: 12 MG/DL — SIGNIFICANT CHANGE UP (ref 10–20)
CALCIUM SERPL-MCNC: 9.4 MG/DL — SIGNIFICANT CHANGE UP (ref 8.5–10.1)
CHLORIDE SERPL-SCNC: 104 MMOL/L — SIGNIFICANT CHANGE UP (ref 98–110)
CO2 SERPL-SCNC: 23 MMOL/L — SIGNIFICANT CHANGE UP (ref 17–32)
CREAT SERPL-MCNC: 0.5 MG/DL — LOW (ref 0.7–1.5)
EGFR: 134 ML/MIN/1.73M2 — SIGNIFICANT CHANGE UP
EOSINOPHIL # BLD AUTO: 0.13 K/UL — SIGNIFICANT CHANGE UP (ref 0–0.7)
EOSINOPHIL NFR BLD AUTO: 1.5 % — SIGNIFICANT CHANGE UP (ref 0–8)
GLUCOSE SERPL-MCNC: 88 MG/DL — SIGNIFICANT CHANGE UP (ref 70–99)
HCG SERPL QL: NEGATIVE — SIGNIFICANT CHANGE UP
HCT VFR BLD CALC: 33.7 % — LOW (ref 37–47)
HGB BLD-MCNC: 10.7 G/DL — LOW (ref 12–16)
IMM GRANULOCYTES NFR BLD AUTO: 0.2 % — SIGNIFICANT CHANGE UP (ref 0.1–0.3)
LYMPHOCYTES # BLD AUTO: 2.8 K/UL — SIGNIFICANT CHANGE UP (ref 1.2–3.4)
LYMPHOCYTES # BLD AUTO: 32.5 % — SIGNIFICANT CHANGE UP (ref 20.5–51.1)
MAGNESIUM SERPL-MCNC: 1.7 MG/DL — LOW (ref 1.8–2.4)
MCHC RBC-ENTMCNC: 27.2 PG — SIGNIFICANT CHANGE UP (ref 27–31)
MCHC RBC-ENTMCNC: 31.8 G/DL — LOW (ref 32–37)
MCV RBC AUTO: 85.5 FL — SIGNIFICANT CHANGE UP (ref 81–99)
MONOCYTES # BLD AUTO: 0.58 K/UL — SIGNIFICANT CHANGE UP (ref 0.1–0.6)
MONOCYTES NFR BLD AUTO: 6.7 % — SIGNIFICANT CHANGE UP (ref 1.7–9.3)
NEUTROPHILS # BLD AUTO: 5.06 K/UL — SIGNIFICANT CHANGE UP (ref 1.4–6.5)
NEUTROPHILS NFR BLD AUTO: 58.8 % — SIGNIFICANT CHANGE UP (ref 42.2–75.2)
NRBC # BLD: 0 /100 WBCS — SIGNIFICANT CHANGE UP (ref 0–0)
NT-PROBNP SERPL-SCNC: <5 PG/ML — SIGNIFICANT CHANGE UP (ref 0–300)
PLATELET # BLD AUTO: 313 K/UL — SIGNIFICANT CHANGE UP (ref 130–400)
POTASSIUM SERPL-MCNC: 4.2 MMOL/L — SIGNIFICANT CHANGE UP (ref 3.5–5)
POTASSIUM SERPL-SCNC: 4.2 MMOL/L — SIGNIFICANT CHANGE UP (ref 3.5–5)
PROT SERPL-MCNC: 7.2 G/DL — SIGNIFICANT CHANGE UP (ref 6–8)
RBC # BLD: 3.94 M/UL — LOW (ref 4.2–5.4)
RBC # FLD: 17.1 % — HIGH (ref 11.5–14.5)
SODIUM SERPL-SCNC: 137 MMOL/L — SIGNIFICANT CHANGE UP (ref 135–146)
TROPONIN T SERPL-MCNC: <0.01 NG/ML — SIGNIFICANT CHANGE UP
WBC # BLD: 8.62 K/UL — SIGNIFICANT CHANGE UP (ref 4.8–10.8)
WBC # FLD AUTO: 8.62 K/UL — SIGNIFICANT CHANGE UP (ref 4.8–10.8)

## 2022-09-07 PROCEDURE — 71046 X-RAY EXAM CHEST 2 VIEWS: CPT | Mod: 26

## 2022-09-07 PROCEDURE — 93010 ELECTROCARDIOGRAM REPORT: CPT

## 2022-09-07 PROCEDURE — 99285 EMERGENCY DEPT VISIT HI MDM: CPT

## 2022-09-07 RX ORDER — MAGNESIUM SULFATE 500 MG/ML
2 VIAL (ML) INJECTION ONCE
Refills: 0 | Status: COMPLETED | OUTPATIENT
Start: 2022-09-07 | End: 2022-09-07

## 2022-09-07 RX ADMIN — Medication 25 GRAM(S): at 06:14

## 2022-09-07 NOTE — ED ADULT NURSE NOTE - CAS TRG GENERAL AIRWAY, MLM
Nothing spicy, greasy or fried food for the first 12 hours to prevent nausea and vomiting.  start with clear liquids and gradually increase your diet as you can, until you return to your normal diet. Patent

## 2022-09-07 NOTE — ED PROVIDER NOTE - PATIENT PORTAL LINK FT
You can access the FollowMyHealth Patient Portal offered by [facility] by registering at the following website: [email or web address]. By joining [employer]’s FollowMyHealth portal, you will also be able to view your health information using other applications (apps) compatible with our system.

## 2022-09-07 NOTE — ED PROVIDER NOTE - CLINICAL SUMMARY MEDICAL DECISION MAKING FREE TEXT BOX
CP - ekg, cxr wnl, labs sig for Mg 1.7 repleted - all results d/w pt & copies given, strict return precautions discussed, rec outpt cardio/hemonc f/u

## 2022-09-07 NOTE — ED ADULT NURSE NOTE - READER
Subjective:  - Feeling well. Able to walk in the room without dyspnea  - Noted some increase in UOP  - Denies SOB, lightheadedness, abdominal discomfort, orthopnea, although doesn't sleep flat due to back pain    Medications:  amLODIPine   Tablet 5 milliGRAM(s) Oral daily  apixaban 2.5 milliGRAM(s) Oral every 12 hours  artificial  tears Solution 1 Drop(s) Both EYES two times a day  aspirin enteric coated 81 milliGRAM(s) Oral daily  atorvastatin 40 milliGRAM(s) Oral at bedtime  carvedilol 12.5 milliGRAM(s) Oral every 12 hours  chlorhexidine 2% Cloths 1 Application(s) Topical <User Schedule>  clopidogrel Tablet 75 milliGRAM(s) Oral daily  dextrose 50% Injectable 25 Gram(s) IV Push once  dextrose 50% Injectable 25 Gram(s) IV Push once  furosemide    Tablet 40 milliGRAM(s) Oral daily  hydrALAZINE 100 milliGRAM(s) Oral <User Schedule>  insulin lispro (ADMELOG) corrective regimen sliding scale   SubCutaneous three times a day before meals  insulin lispro (ADMELOG) corrective regimen sliding scale   SubCutaneous at bedtime  isosorbide   dinitrate Tablet (ISORDIL) 40 milliGRAM(s) Oral three times a day  polyethylene glycol 3350 17 Gram(s) Oral daily  senna 2 Tablet(s) Oral at bedtime      Physical Exam:    Vitals:  Vital Signs Last 24 Hours  T(C): 36.3 (21 @ 05:15), Max: 36.7 (21 @ 15:35)  HR: 62 (21 @ 05:15) (56 - 76)  BP: 146/63 (21 @ 05:15) (141/64 - 154/69)  RR: 18 (21 @ 05:15) (18 - 18)  SpO2: 98% (21 @ 05:15) (97% - 100%)    Weight in k.7 ( @ 05:15)    I&O's Summary    12 Aug 2021 07:01  -  13 Aug 2021 07:00  --------------------------------------------------------  IN: 880 mL / OUT: 750 mL / NET: 130 mL    Tele: SB-SR 50-80s, PAT 120s 8 seconds    General: No distress. Comfortable.  HEENT: EOM intact.  Neck: Neck supple. JVP ~12 cm H2O. No masses  Chest: CTA  CV: Regular, Normal S1 and S2. No murmurs, rub, or gallops. Radial pulses normal. Trace LE edema R>L  Abdomen: Soft, non-distended, non-tender  Skin: No rashes or skin breakdown. Warm peripherally  Neurology: Alert and oriented times three. Sensation intact  Psych: Affect normal    Labs:                        8.4    5.05  )-----------( 148      ( 12 Aug 2021 06:53 )             27.3     08-13    134<L>  |  99  |  22  ----------------------------<  96  4.1   |  22  |  1.79<H>    Ca    9.4      13 Aug 2021 07:36  Phos  3.3     08-13  Mg     2.0     08-13   Yes

## 2022-09-07 NOTE — ED ADULT NURSE NOTE - PRO INTERPRETER NEED 2
Last ov - 06/25/2021 - Dr. Oleg Javed  Next ov - 09/24/2021      Barry last filled  02/16/2021 - 1 rf
English

## 2022-09-07 NOTE — ED PROVIDER NOTE - ATTENDING APP SHARED VISIT CONTRIBUTION OF CARE
22F pmh active non-HL on chemo via R chest wall port-a-cath p/w CP x 1d. Began after walking, L sided radiating to L arm, intermitt x 3h. Non-pleuritic, no f/c, uri sx, sob/limon, cough/hemoptysis, nv, abd pain, edema. States she had cardiac w/u (ekg, echo) prior to initiation of chemo. No recent travel/surgery/immobilization/hormone use. No FHx early HD/SCD.    PE:  thin f nad  skin warm, dry  ncat  neck supple  chest- RU chest wall port-a-cat in place, site c/d, no signs of infection, non-ttp  rrr nml s1s2 no mrg  ctab no wrr  abd soft ntnd no palpable masses no rgr  back non-tender no cvat  ext no cce dpi  neuro aaox3 grossly nf exam

## 2022-09-07 NOTE — ED PROVIDER NOTE - NSFOLLOWUPINSTRUCTIONS_ED_ALL_ED_FT
Please follow up with your primary care physician within 24-72 hours and return immediately if symptoms worsen.    Chest Pain    WHAT YOU NEED TO KNOW:    Chest pain can be caused by a range of conditions, from not serious to life-threatening. Chest pain can be a symptom of a digestive problem, such as acid reflux or a stomach ulcer. An anxiety attack or a strong emotion, such as anger, can also cause chest pain. Infection, inflammation, or a fracture in the bones or cartilage in your chest can cause pain or discomfort. Sometimes chest pain or pressure is caused by poor blood flow to your heart (angina). Chest pain may also be caused by life-threatening conditions such as a heart attack or blood clot in your lungs.     DISCHARGE INSTRUCTIONS:    Call 911 if:     You have any of the following signs of a heart attack:   Squeezing, pressure, or pain in your chest       and any of the following:   Discomfort or pain in your back, neck, jaw, stomach, or arm       Shortness of breath      Nausea or vomiting      Lightheadedness or a sudden cold sweat        Return to the emergency department if:     You have chest discomfort that gets worse, even with medicine.      You cough or vomit blood.       Your bowel movements are black or bloody.       You cannot stop vomiting, or it hurts to swallow.     Contact your healthcare provider if:     You have questions or concerns about your condition or care.        Medicines:     Medicines may be given to treat the cause of your chest pain. Examples include pain medicine, anxiety medicine, or medicines to increase blood flow to your heart.       Do not take certain medicines without asking your healthcare provider first. These include NSAIDs, herbal or vitamin supplements, or hormones (estrogen or progestin).       Take your medicine as directed. Contact your healthcare provider if you think your medicine is not helping or if you have side effects. Tell him or her if you are allergic to any medicine. Keep a list of the medicines, vitamins, and herbs you take. Include the amounts, and when and why you take them. Bring the list or the pill bottles to follow-up visits. Carry your medicine list with you in case of an emergency.    Follow up with your healthcare provider within 72 hours, or as directed: You may need to return for more tests to find the cause of your chest pain. You may be referred to a specialist, such as a cardiologist or gastroenterologist. Write down your questions so you remember to ask them during your visits.     Healthy living tips: The following are general healthy guidelines. If your chest pain is caused by a heart problem, your healthcare provider will give you specific guidelines to follow.    Do not smoke. Nicotine and other chemicals in cigarettes and cigars can cause lung and heart damage. Ask your healthcare provider for information if you currently smoke and need help to quit. E-cigarettes or smokeless tobacco still contain nicotine. Talk to your healthcare provider before you use these products.       Eat a variety of healthy, low-fat, low-salt foods. Healthy foods include fruits, vegetables, whole-grain breads, low-fat dairy products, beans, lean meats, and fish. Ask for more information about a heart healthy diet.      Drink plenty of water every day. Your body is made of mostly water. Water helps your body to control your temperature and blood pressure. Ask your healthcare provider how much water you should drink every day.      Ask about activity. Your healthcare provider will tell you which activities to limit or avoid. Ask when you can drive, return to work, and have sex. Ask about the best exercise plan for you.      Maintain a healthy weight. Ask your healthcare provider how much you should weigh. Ask him or her to help you create a weight loss plan if you are overweight.       Get the flu and pneumonia vaccines. All adults should get the influenza (flu) vaccine. Get it every year as soon as it becomes available. The pneumococcal vaccine is given to adults aged 65 years or older. The vaccine is given every 5 years to prevent pneumococcal disease, such as pneumonia.    If you have a stent:     Carry your stent card with you at all times.       Let all healthcare providers know that you have a stent.          © Copyright [employer] 2019 All illustrations and images included in CareNotes are the copyrighted property of [employer] Inc[employer] or [employer].

## 2022-09-07 NOTE — ED ADULT NURSE NOTE - NSFALLRSKASSESASSIST_ED_ALL_ED
Understanding Bartholin Cyst and Abscess     A woman has two Bartholin glands. They are located on the sides of the vaginal opening. These pea-sized glands make mucus. This mucus lubricates the outside part of the vagina (vulva).  If a tube (duct) in one · Redness, swelling, or fluid leaking from the cyst that gets worse  · Pain that gets worse  · Symptoms that don’t get better, or get worse  · New symptoms  Antonina last reviewed this educational content on 12/1/2019  © 3047-2143 [employer] no

## 2022-09-07 NOTE — ED PROVIDER NOTE - CARE PROVIDER_API CALL
Milton Ramos  CARDIOVASCULAR DISEASE  [address]  [place] [address]  Phone: [phone]  Fax: [phone]  Follow Up Time: 1-3 Days    Varsha Briones)  Hematology; Medical Oncology  [address] [place] [address]  Phone: [phone]  Fax: [phone]  Follow Up Time: 1-3 Days

## 2022-09-07 NOTE — ED PROVIDER NOTE - PROVIDER TOKENS
PROVIDER:[TOKEN:[[id number]],FOLLOWUP:[1-3 Days]],PROVIDER:[TOKEN:[[id number]],FOLLOWUP:[1-3 Days]]

## 2022-09-07 NOTE — ED ADULT TRIAGE NOTE - CHIEF COMPLAINT QUOTE
I'm having some chest pain, normally I would just ignore it, but its lasting over three hours and my left arm is hurting a little - patient  Patient on chemo for Hodgkin's Lymphoma

## 2022-09-07 NOTE — ED PROVIDER NOTE - NS ED ATTENDING STATEMENT MOD
This was a shared visit with the STANLEY. I reviewed and verified the documentation and independently performed the documented:

## 2022-09-07 NOTE — ED PROVIDER NOTE - OBJECTIVE STATEMENT
21 yo female with a pmh of non-hodgkin's lymphoma presents c/o chest pain. pt states after going on a walk she started to have L chest pain that radiated to her arm. pt denies any notes aggravating/alleviating factors or SOB. pt denies any other symptoms including fevers, chill, headache, recent illness/travel, cough, abdominal pain, or SOB.

## 2022-09-08 ENCOUNTER — OUTPATIENT (OUTPATIENT)
Dept: OUTPATIENT SERVICES | Facility: HOSPITAL | Age: 22
LOS: 1 days | Discharge: HOME | End: 2022-09-08

## 2022-09-08 ENCOUNTER — APPOINTMENT (OUTPATIENT)
Dept: HEMATOLOGY ONCOLOGY | Facility: CLINIC | Age: 22
End: 2022-09-08

## 2022-09-08 VITALS
HEIGHT: 63 IN | TEMPERATURE: 98.1 F | WEIGHT: 120 LBS | HEART RATE: 91 BPM | DIASTOLIC BLOOD PRESSURE: 81 MMHG | RESPIRATION RATE: 18 BRPM | SYSTOLIC BLOOD PRESSURE: 122 MMHG | BODY MASS INDEX: 21.26 KG/M2

## 2022-09-08 DIAGNOSIS — C81.90 HODGKIN LYMPHOMA, UNSPECIFIED, UNSPECIFIED SITE: ICD-10-CM

## 2022-09-08 PROCEDURE — 99215 OFFICE O/P EST HI 40 MIN: CPT

## 2022-09-09 NOTE — RESULTS/DATA
[FreeTextEntry1] : EXAM: PETCT [facility] ONC FDG SUBS\par \par *** ADDENDUM***\par \par PROCEDURE: PET/CT SKULL BASE-MID THIGH IMAGING\par \par CLINICAL HISTORY / REASON FOR EXAM: Hodgkin's lymphoma. History of classic stage IV Hodgkin's lymphoma, post chemotherapy.\par \par RADIOPHARMACEUTICAL: 11.1 mCi F-18, FDG, I.V.\par \par TECHNIQUE: Blood glucose pre injection 75 mg/dL. Approximately 45 minutes after the intravenous administration 18-Fluorine FDG, whole body PET images were acquired from base of skull to mid - thigh. In addition, non-contrast, low dose, non - diagnostic CT was acquired for attenuation correction and anatomic correlation purposes only.\par \par COMPARISON: PET/CT March 8, 2022.\par \par FINDINGS:\par \par HEAD/FACE: Physiologic FDG uptake is seen in the visualized regions of the brain, large salivary glands and oropharynx.\par \par NECK: Interval decrease in size and resolution to non-FDG avid status of previously described left cervical lymphadenopathy (Deauville score of 1).\par \par CHEST: Physiologic FDG avidity is seen in mediastinal blood pool, myocardium.\par \par LUNGS: Interval resolution of previously described right middle lobe and left lower lobe FDG avid nodular opacities (Deauville score of 1).\par \par PLEURA/PERICARDIUM: No abnormal uptake.\par \par THORACIC NODES: Interval decrease in size and resolution to non-FDG avid status of previously described right supraclavicular, left hilar and mediastinal lymphadenopathy (Deauville score of 1).\par \par HEPATOBILIARY: No abnormal uptake.\par \par SPLEEN: Interval resolution of previously described FDG avid splenic lesions (Deauville score of 1).\par \par PANCREAS: No abnormal uptake.\par \par ADRENAL GLANDS: No abnormal uptake.\par \par KIDNEYS/URETERS/BLADDER: Excreted activity is seen.\par \par ABDOMINOPELVIC NODES: Interval resolution of previously described FDG avid periportal, retroperitoneal and pelvic lymphadenopathy (Deauville score of 1).\par \par BOWEL/PERITONEUM/MESENTERY: No abnormal uptake.\par \par PELVIC ORGANS: No abnormal uptake.\par \par BONES/SOFT TISSUES: Previously described large FDG right iliac wing mass now with decreased extent of FDG avid components with decreased size of extraosseous soft tissue component, now measuring 4 x 2 cm with max SUV 8.1, previously measuring 8 x 5.3 cm with prior max SUV 14 (42% decrease: partial response ([place]): Deauville score of 4). Decreased size of FDG avid right sacral mass, now measuring approximately 5.7 x 4.2 cm, previously 7.8 x 6.2 cm, with now decreased residual heterogeneous FDG activity with current max SUV 8.9, previously 16.5 (46% decrease; partial response ([place]): Deauville score of 4 ).\par \par Generalized diffuse increased bone marrow uptake, which may be attributed to post chemotherapy effect.\par \par OTHER: Unchanged right chest port catheter.\par \par IMPRESSION:\par Since March 8, 2022:\par \par 1. No new sites of pathologic FDG uptake.\par \par 2. Previously described large FDG right iliac wing mass now with decreased extent of FDG avid components with decreased size of extraosseous soft tissue component, now measuring 4 x 2 cm with max SUV 8.1, previously measuring 8 x 5.3 cm with prior max SUV 14 (42% decrease; partial response ([place]): Deauville score of 4). Decreased size of FDG avid right sacral mass, now measuring approximately 5.7 x 4.2 cm, previously 7.8 x 6.2 cm, with now decreased residual heterogeneous FDG activity with current max SUV 8.9, previously 16.5 (46% decrease; partial response ([place]): Deauville score of 4).\par \par 3. Interval resolution of the cervical, thoracic, and abdominopelvic FDG avid lymphadenopathy (Deauville score of 1).\par \par 4. Interval resolution of previously described FDG avid splenic lesions (Deauville score of 1). Interval resolution of previously described lung FDG avid nodular opacities (Deauville score of 1).\par \par --- End of Report ---\par \par *** END OF ADDENDUM***\par \par \par

## 2022-09-09 NOTE — CONSULT LETTER
[Dear  ___] : Dear  [unfilled], [Consult Letter:] : I had the pleasure of evaluating your patient, [unfilled]. [Please see my note below.] : Please see my note below. [Consult Closing:] : Thank you very much for allowing me to participate in the care of this patient.  If you have any questions, please do not hesitate to contact me. [Sincerely,] : Sincerely, [FreeTextEntry3] : Maggi Mann MD\par Professor [employer]\par [place]\par Attending Physician\par [facility] [place]\par [phone]\par

## 2022-09-09 NOTE — REVIEW OF SYSTEMS
[Night Sweats] : night sweats [Recent Change In Weight] : ~T recent weight change [Joint Pain] : joint pain [Swollen Glands] : swollen glands [Negative] : Allergic/Immunologic [de-identified] : pruritus

## 2022-09-09 NOTE — HISTORY OF PRESENT ILLNESS
[de-identified] : Patient is a 22 year old female with no significant PMHx who presents for classic Hodgkin lymphoma. (no PMHx, no surg, no allergies, FamHx mom anemia, \par \par In January 2020 she first noticed a left cervical lymph node. She had an US performed 12/2020 which showed 2 enlarged lymph nodes, the largest being 2cm. She was referred to an outside hematologist. At that time she just had bloodwork done, told it showed "inflammation" and was instructed to monitor it. Then in January 2021 she developed hip pain, was told it was bursitis and was given meloxicam. Pain improved a little, then she developed pain in the lower back, just told to continue NSAIDs and consider PT. \par \par She then developed right inguinal adenopathy and left axillary adenopathy, this was noticed right after she received the COVID vaccine and she again was told to monitor due to it likely being a reaction from the vaccine. Eventually saw OB, who sent her for a pelvic US which demonstrated pelvic adenopathy. She was then sent for CT CAP and neck - and in summary she has diffuse adenopathy above and below the diaphragm including bilateral mediastinal and hilar lymphadenopathy, an anterior mediastinal mass, a lung lesion  of 9mm, matted lymphadenopathy seen at the hepatic hilum circumferentially encasing the portal vein and common hepatic artery, bilateral iliac, inguinal and para-aortic adenopathy with multicentric soft tissue mass involving the right iliacus, right gluteus and sacrum/parasacral region with invasion of the spinal canal. \par \par She went for inguinal biopsy on 2/17/22 which showed classical Hodgkin lymphoma. She discussed with her outside hematologist who recommended BV-AVD. She presents to us for a second opinion. \par \par She denies lower extremity weakness, incontinence, saddle parasthesia. However, she does report night sweats for about the past 8 months, slight weight loss but just recently and she attributes this to anxiety about her recent diagnosis, and some fatigue. She is also still having significant pain in her lower back and hip.  \par \par She was initially found to have lymphadenopathy at least as far back as 12/2020 seen on neck US which showed 2 enlarged lymph nodes, the laergest 2cm. She had CT chest 2/2021 which showed scattered bilateral hilar and mediastinal afullness, "underlying adenopathy is a consideration". She had another CT chest in April which showed mild unchanged subcarinal and hilar adenopathy . No other imaging until pelvic US 1/2022, which showed pelvic adenopathy (no mention of adenopathy on pelvic US 1 year earlier)\par \par \par CT CAP and neck show diffuse disease above and below the diaphragm with suspicious bone lesions. \par \par \par IMPRESSION:\par \par Multicentric soft tissue mass involving the right illiacus, right gluteus \par medius muscles and the sacrum/parasacral region as detailed above. Sacral \par component demonstrates gross invasion of the spinal canal.\par \par Bilateral iliac chain (up to the aortic bifurcation) and right inguinal \par lymphadenopathy.\par \par Findings are consistent with an aggressive malignant process with right \par inguinal and bilateral iliac chain lymphadenopathy.\par \par \par  [de-identified] : 9/8/22\par Pt is here for second opinion.\par She has completed 7 cycles of BV-AVD.  Her first restaging PET scan was done after 6 cycles and did not show CR.\par Her med onc d/w multiple options including RT. They chose to go ahead with 2 more cycles of BV-AVD as she did not want to go thru RT. Plan is to have another PET scan after 2 cycles and she is worried what would happen if PET was still positive, She has mild neuropathy\par She feels better, although has fatigue.\par No fever or night sweats

## 2022-09-09 NOTE — PHYSICAL EXAM
[Fully active, able to carry on all pre-disease performance without restriction] : Status 0 - Fully active, able to carry on all pre-disease performance without restriction [Normal] : supple without JVD, no thyromegaly or masses appreciated

## 2022-09-09 NOTE — ASSESSMENT
[FreeTextEntry1] : In summary, Melissa is a 22 year old female with H/O  classic Hodgkin lymphoma, Stage IV treated elsewhere with 7 cycles of BV AVD. PET scan after 6 cycles showed [place]\par \par I have prepared the note after I have reviewed the previous notes, reviewed the radiological and laboratory studies and have communicated those with the patient and her mother. I discussed the natural history, prognosis and treatment of Stage IV classic Hodgkin lymphoma. I reviewed the PET scan reports  with them in detail.\par \par I had a very long discussion with the pt and her mother. I discussed that an interim PET scan is usually done after 2 cycles of chemotherapy and decision to escalate or deescalate therapy are made based on the Deauville score.\par I spoke to the radiologist and had an addendum placed with Deauville scores.\par Unfortunately the Deauville score is 4 for the pelvic masses.\par \par I discussed treatment options with the pt.\par I don’t think continuing BV-AVD will necessarily lead to a CR.\par If the PET scan was done after 2 cycles of BV-AVD one could consider continuing with 2 more cycles and then base treatment decision on the PET scan done after those additional 2 cycles are completed.\par Since pt had completed 6 cycles of BV-AVD, we cannot use the above rationale anymore.\par \par Her options are RT to the residual mass ( preferable) or go on to escalated BEACOPP or another regimen for refractory disease ( gemzar based or DHAP etc).\par We also discussed biopsy although if the biopsy is negative I am not sure if one could go with observation only since FDG activity in the residual masses is still quite high.\par  \par Pt will talk to her treating oncologist and make a decision accordingly.\par Contact info for Rad/Onc was given to pt.\par RTC as needed\par \par

## 2022-09-13 DIAGNOSIS — C81.90 HODGKIN LYMPHOMA, UNSPECIFIED, UNSPECIFIED SITE: ICD-10-CM

## 2022-09-23 ENCOUNTER — OUTPATIENT (OUTPATIENT)
Dept: OUTPATIENT SERVICES | Facility: HOSPITAL | Age: 22
LOS: 1 days | Discharge: HOME | End: 2022-09-23

## 2022-09-23 DIAGNOSIS — R93.5 ABNORMAL FINDINGS ON DIAGNOSTIC IMAGING OF OTHER ABDOMINAL REGIONS, INCLUDING RETROPERITONEUM: ICD-10-CM

## 2022-09-23 LAB — GLUCOSE BLDC GLUCOMTR-MCNC: 72 MG/DL — SIGNIFICANT CHANGE UP (ref 70–99)

## 2022-09-23 PROCEDURE — 78815 PET IMAGE W/CT SKULL-THIGH: CPT | Mod: 26,PI

## 2023-01-21 ENCOUNTER — OUTPATIENT (OUTPATIENT)
Dept: OUTPATIENT SERVICES | Facility: HOSPITAL | Age: 23
LOS: 1 days | Discharge: HOME | End: 2023-01-21
Payer: MEDICAID

## 2023-01-21 DIAGNOSIS — R10.2 PELVIC AND PERINEAL PAIN: ICD-10-CM

## 2023-01-21 DIAGNOSIS — C81.90 HODGKIN LYMPHOMA, UNSPECIFIED, UNSPECIFIED SITE: ICD-10-CM

## 2023-01-21 DIAGNOSIS — Q52.11 TRANSVERSE VAGINAL SEPTUM: ICD-10-CM

## 2023-01-21 DIAGNOSIS — R10.9 UNSPECIFIED ABDOMINAL PAIN: ICD-10-CM

## 2023-01-21 PROCEDURE — 76856 US EXAM PELVIC COMPLETE: CPT | Mod: 26

## 2023-01-21 PROCEDURE — 76830 TRANSVAGINAL US NON-OB: CPT | Mod: 26

## 2023-02-14 ENCOUNTER — NON-APPOINTMENT (OUTPATIENT)
Age: 23
End: 2023-02-14

## 2023-02-20 NOTE — ED ADULT TRIAGE NOTE - BMI (KG/M2)
Patient is coming in from the [facility] and has been showing aggression tendencies and the last time the same episode happened he had UTI. Patient was brought in by son. Denies SI or HI. 23

## 2023-03-03 ENCOUNTER — NON-APPOINTMENT (OUTPATIENT)
Age: 23
End: 2023-03-03

## 2023-10-12 NOTE — ED PROVIDER NOTE - EKG ADDITIONAL QUESTION - PERFORMED INDEPENDENT VISUALIZATION
#Anxiety   - Pt is taking her medication   - Pt report feeling better. Trigger point: driving stuff in traffic,  HR raising.   - Pt start taking medication since  08/26 tapering dose.   - She is taking lexapro 20 mg for the last last 3 weeks.  ~9/21   - Psychologist  but move to [place].    - She reports feeling 7/10 better.  Plan   - Lexapro  20 mg qd stabke dose   - Clonazepam 0.25  PRN for rescued    - Behavioral health for CBT    Yes

## 2024-12-17 NOTE — H&P PST ADULT - HEIGHT IN CM
Medicare Wellness  Personal Prevention Plan of Service     Date of Office Visit:    Encounter Provider:  Evan Rivas DO  Place of Service:  [facility]  Patient Name: Zo Palma  :  1967    As part of the Medicare Wellness portion of your visit today, we are providing you with this personalized preventive plan of services (PPPS). This plan is based upon recommendations of the [place] Preventive Services Task Force (USPSTF) and the Advisory Committee on Immunization Practices (ACIP).    This lists the preventive care services that should be considered, and provides dates of when you are due. Items listed as completed are up-to-date and do not require any further intervention.    Health Maintenance   Topic Date Due   • DIABETIC EYE EXAM  Never done   • COVID-19 Vaccine (1 - 2024-25 season) Never done   • ANNUAL WELLNESS VISIT  2024   • BMI FOLLOWUP  2024   • MAMMOGRAM  2024   • COLORECTAL CANCER SCREENING  2025   • HEMOGLOBIN A1C  2025   • LIPID PANEL  10/16/2025   • URINE MICROALBUMIN  2025   • TDAP/TD VACCINES (4 - Td or Tdap) 2031   • HEPATITIS C SCREENING  Completed   • Hepatitis B  Completed   • Pneumococcal Vaccine 0-64  Completed   • INFLUENZA VACCINE  Completed   • ZOSTER VACCINE  Completed       Orders Placed This Encounter   Procedures   • POC Microalbumin     Order Specific Question:   Release to patient     Answer:   Routine Release [[id number]]       No follow-ups on file.          
154.94
